# Patient Record
Sex: FEMALE | Race: WHITE | NOT HISPANIC OR LATINO | Employment: FULL TIME | URBAN - METROPOLITAN AREA
[De-identification: names, ages, dates, MRNs, and addresses within clinical notes are randomized per-mention and may not be internally consistent; named-entity substitution may affect disease eponyms.]

---

## 2021-10-27 ENCOUNTER — INITIAL PRENATAL (OUTPATIENT)
Dept: OBSTETRICS AND GYNECOLOGY | Facility: CLINIC | Age: 31
End: 2021-10-27

## 2021-10-27 VITALS
HEIGHT: 68 IN | BODY MASS INDEX: 33.34 KG/M2 | DIASTOLIC BLOOD PRESSURE: 70 MMHG | SYSTOLIC BLOOD PRESSURE: 118 MMHG | WEIGHT: 220 LBS

## 2021-10-27 DIAGNOSIS — Z3A.08 8 WEEKS GESTATION OF PREGNANCY: Primary | ICD-10-CM

## 2021-10-27 DIAGNOSIS — N83.8 OVARIAN MASS, RIGHT: ICD-10-CM

## 2021-10-27 PROCEDURE — 0501F PRENATAL FLOW SHEET: CPT | Performed by: OBSTETRICS & GYNECOLOGY

## 2021-10-27 NOTE — PROGRESS NOTES
Initial ob visit     CC- Here for care of pregnancy        Keyonna Truong is a 31 y.o. female, , who presents for her first obstetrical visit.  Patient's last menstrual period was 2021.    OB History    Para Term  AB Living   1             SAB IAB Ectopic Molar Multiple Live Births                    # Outcome Date GA Lbr Tyree/2nd Weight Sex Delivery Anes PTL Lv   1 Current                Initial positive test date : 2021, UPT          Prior obstetric issues, potential pregnancy concerns: ADHD, Anxiety   Family history of genetic issues (includes FOB): denies  Prior infections concerning in pregnancy (Rash, fever in last 2 weeks): denies  Varicella Hx -unsure  Prior testing for Cystic Fibrosis Carrier or Sickle Cell Trait- denies  Prepregnancy BMI - Body mass index is 33.95 kg/m².  History of STD: yes HPV  Ultrasound Today: Yes.  Findings showed viable pregnancy.  I have personally evaluated the U/S and agree with the findings. Denver Reaves MD    Additional Pertinent History   Last Pap :   Last Completed Pap Smear     This patient has no relevant Health Maintenance data.        History of abnormal Pap smear: yes - Colpo - not needed  Family history of uterine, colon, breast, or ovarian cancer: yes - MGM  Feelings of Anxiety or Depression: yes - stable off meds  Tobacco Usage?: No Quit with + UPT  Alcohol/Drug Use?: NO  Over the age of 35 at delivery: no  Desires Genetic Screening: Cell Free DNA  Flu Status: already had the flu vaccine     [facility]  Past Medical History:   Diagnosis Date   • Abnormal Pap smear of cervix    • Dysmenorrhea    • HPV (human papilloma virus) infection    • Pelvic pain        Current Outpatient Medications:   •  Prenat w/o B-MT-Louxqmm-FA-DHA (PNV-DHA PO), Take  by mouth., Disp: , Rfl:     The additional following portions of the patient's history were reviewed and updated as appropriate: allergies, current medications, past family history, past medical  "history, past social history, past surgical history and problem list.    Review of Systems   Review of Systems   Constitutional: Negative.    HENT: Negative.    Eyes: Negative.    Respiratory: Negative.    Cardiovascular: Negative.    Gastrointestinal: Negative.    Endocrine: Negative.    Genitourinary: Negative.    Musculoskeletal: Negative.    Skin: Negative.    Allergic/Immunologic: Negative.    Neurological: Negative.    Hematological: Negative.    Psychiatric/Behavioral: Negative.      Current obstetric complaints : Nausea   All systems reviewed and otherwise normal.    I have reviewed and agree with the HPI, ROS, and historical information as entered above. Denver Reaves MD    /70   Ht 171.5 cm (67.5\")   Wt 99.8 kg (220 lb)   LMP 2021   BMI 33.95 kg/m²     Physical Exam  General:  well developed; well nourished  no acute distress   Chest/Respiratory: No labored breathing, normal respiratory effort, normal appearance, no respiratory noises noted   Heart:  normal rate, regular rhythm,  no murmurs, rubs, or gallops   Thyroid: normal to inspection and palpation   Breasts:  Not performed.   Abdomen: soft, non-tender; no masses  no umbilical or inguinal hernias are present  no hepato-splenomegaly   Pelvis: Not performed.        Assessment and Plan    Problem List Items Addressed This Visit        Genitourinary and Reproductive     Ovarian mass, right    Overview     Solid-appearing 5 x 6 cm, follow-up at [facility] after 12 weeks            Gravid and     8 weeks gestation of pregnancy - Primary    Relevant Orders    Obstetric Panel    HIV-1 / O / 2 Ag / Antibody 4th Generation    Chlamydia trachomatis, Neisseria gonorrhoeae, PCR w/ confirmation - Urine, Urine, Clean Catch    Urine Drug Screen - Urine, Clean Catch    Urine Culture - Urine, Urine, Clean Catch    Urinalysis With Microscopic - Urine, Clean Catch          1. Pregnancy at 8w1d  2. Reviewed routine prenatal care with the office and " educational materials given  Return in about 4 weeks (around 11/24/2021), or 2 weeks for cell free DNA, 4 weeks for prenatal visit.      Denver Reaves MD  10/27/2021

## 2021-11-03 LAB
ABO GROUP BLD: NORMAL
AMPHETAMINES UR QL SCN: NEGATIVE NG/ML
APPEARANCE UR: CLEAR
BACTERIA #/AREA URNS HPF: ABNORMAL /[HPF]
BACTERIA UR CULT: NORMAL
BACTERIA UR CULT: NORMAL
BARBITURATES UR QL SCN: NEGATIVE NG/ML
BASOPHILS # BLD AUTO: 0.1 X10E3/UL (ref 0–0.2)
BASOPHILS NFR BLD AUTO: 1 %
BENZODIAZ UR QL SCN: NEGATIVE NG/ML
BILIRUB UR QL STRIP: NEGATIVE
BLD GP AB SCN SERPL QL: NEGATIVE
BZE UR QL SCN: NEGATIVE NG/ML
CANNABINOIDS UR QL SCN: NEGATIVE NG/ML
CASTS URNS QL MICRO: ABNORMAL /LPF
COLOR UR: YELLOW
CREAT UR-MCNC: 187 MG/DL (ref 20–300)
CRYSTALS URNS MICRO: ABNORMAL
EOSINOPHIL # BLD AUTO: 0.3 X10E3/UL (ref 0–0.4)
EOSINOPHIL NFR BLD AUTO: 3 %
EPI CELLS #/AREA URNS HPF: ABNORMAL /HPF (ref 0–10)
ERYTHROCYTE [DISTWIDTH] IN BLOOD BY AUTOMATED COUNT: 12.8 % (ref 11.7–15.4)
GLUCOSE UR QL: NEGATIVE
HBV SURFACE AG SERPL QL IA: NEGATIVE
HCT VFR BLD AUTO: 43.8 % (ref 34–46.6)
HCV AB S/CO SERPL IA: <0.1 S/CO RATIO (ref 0–0.9)
HGB BLD-MCNC: 14.4 G/DL (ref 11.1–15.9)
HGB UR QL STRIP: NEGATIVE
HIV 1+2 AB+HIV1 P24 AG SERPL QL IA: NON REACTIVE
IMM GRANULOCYTES # BLD AUTO: 0 X10E3/UL (ref 0–0.1)
IMM GRANULOCYTES NFR BLD AUTO: 0 %
KETONES UR QL STRIP: NEGATIVE
LABORATORY COMMENT REPORT: ABNORMAL
LEUKOCYTE ESTERASE UR QL STRIP: NEGATIVE
LYMPHOCYTES # BLD AUTO: 2.9 X10E3/UL (ref 0.7–3.1)
LYMPHOCYTES NFR BLD AUTO: 27 %
Lab: NORMAL
MCH RBC QN AUTO: 29.8 PG (ref 26.6–33)
MCHC RBC AUTO-ENTMCNC: 32.9 G/DL (ref 31.5–35.7)
MCV RBC AUTO: 91 FL (ref 79–97)
METHADONE UR QL SCN: NEGATIVE NG/ML
MICRO URNS: NORMAL
MICRO URNS: NORMAL
MONOCYTES # BLD AUTO: 0.7 X10E3/UL (ref 0.1–0.9)
MONOCYTES NFR BLD AUTO: 7 %
NEUTROPHILS # BLD AUTO: 6.7 X10E3/UL (ref 1.4–7)
NEUTROPHILS NFR BLD AUTO: 62 %
NITRITE UR QL STRIP: NEGATIVE
OPIATES UR QL SCN: POSITIVE NG/ML
OXYCODONE+OXYMORPHONE UR QL SCN: NEGATIVE NG/ML
PCP UR QL: NEGATIVE NG/ML
PH UR STRIP: 6 [PH] (ref 5–7.5)
PH UR: 5.6 [PH] (ref 4.5–8.9)
PLATELET # BLD AUTO: 339 X10E3/UL (ref 150–450)
PROPOXYPH UR QL SCN: NEGATIVE NG/ML
PROT UR QL STRIP: NEGATIVE
RBC # BLD AUTO: 4.84 X10E6/UL (ref 3.77–5.28)
RBC #/AREA URNS HPF: ABNORMAL /HPF (ref 0–2)
RH BLD: POSITIVE
RPR SER QL: NON REACTIVE
RUBV IGG SERPL IA-ACNC: 8.04 INDEX
SP GR UR: 1.03 (ref 1–1.03)
UNIDENT CRYS URNS QL MICRO: PRESENT
UROBILINOGEN UR STRIP-MCNC: 0.2 MG/DL (ref 0.2–1)
WBC # BLD AUTO: 10.6 X10E3/UL (ref 3.4–10.8)
WBC #/AREA URNS HPF: ABNORMAL /HPF (ref 0–5)

## 2021-11-08 ENCOUNTER — LAB (OUTPATIENT)
Dept: OBSTETRICS AND GYNECOLOGY | Facility: CLINIC | Age: 31
End: 2021-11-08

## 2021-11-08 DIAGNOSIS — Z3A.09 9 WEEKS GESTATION OF PREGNANCY: Primary | ICD-10-CM

## 2021-11-24 ENCOUNTER — ROUTINE PRENATAL (OUTPATIENT)
Dept: OBSTETRICS AND GYNECOLOGY | Facility: CLINIC | Age: 31
End: 2021-11-24

## 2021-11-24 VITALS — DIASTOLIC BLOOD PRESSURE: 82 MMHG | SYSTOLIC BLOOD PRESSURE: 122 MMHG | BODY MASS INDEX: 34.91 KG/M2 | WEIGHT: 226.2 LBS

## 2021-11-24 DIAGNOSIS — N83.299 COMPLEX OVARIAN CYST: ICD-10-CM

## 2021-11-24 DIAGNOSIS — Z3A.12 12 WEEKS GESTATION OF PREGNANCY: Primary | ICD-10-CM

## 2021-11-24 DIAGNOSIS — N83.8 OVARIAN MASS, RIGHT: ICD-10-CM

## 2021-11-24 PROBLEM — Z3A.08 8 WEEKS GESTATION OF PREGNANCY: Status: RESOLVED | Noted: 2021-10-27 | Resolved: 2021-11-24

## 2021-11-24 LAB
GLUCOSE UR STRIP-MCNC: NEGATIVE MG/DL
PROT UR STRIP-MCNC: NEGATIVE MG/DL

## 2021-11-24 PROCEDURE — 0502F SUBSEQUENT PRENATAL CARE: CPT | Performed by: OBSTETRICS & GYNECOLOGY

## 2021-11-24 NOTE — PROGRESS NOTES
OB FOLLOW UP    Keyonna Truong is a 31 y.o.  12w1d patient being seen today for her obstetrical follow up visit. Patient reports fatigue, headache and nausea.       ROS -   Contractions no   problems no  GI problems no  Bleeding or Leaking no  Fetal Movement : absent      Current Outpatient Medications:   •  Prenat w/o I-TR-Uxcqgdw-FA-DHA (PNV-DHA PO), Take  by mouth., Disp: , Rfl:     /82   Wt 103 kg (226 lb 3.2 oz)   LMP 2021   BMI 34.91 kg/m²     FHT:  150 BPM    Uterine Size: size equals dates   Presentations: unsure        Uterus-nontender   Assessment    1. Pregnancy at 12w1d  2. Fetal status reassuring     Problem List Items Addressed This Visit        Genitourinary and Reproductive     Ovarian mass, right    Overview     Solid-appearing 5 x 6 cm, follow-up at [facility] after 12 weeks         Complex ovarian cyst    Overview     Complex ovarian cyst with solid and cystic component, on the right, see [facility] at 14 weeks            Gravid and     12 weeks gestation of pregnancy - Primary    Relevant Orders    POC Urinalysis Dipstick (Completed)          Plan    1. P/patient return in 4 weeks  2. Prenatal teaching done and patient questions were answered  3. See [facility] to check ovarian cyst in 2 weeks    Denver Reaves MD  2021

## 2021-12-08 ENCOUNTER — HOSPITAL ENCOUNTER (OUTPATIENT)
Dept: WOMENS IMAGING | Facility: HOSPITAL | Age: 31
Discharge: HOME | End: 2021-12-08
Admitting: OBSTETRICS & GYNECOLOGY

## 2021-12-08 ENCOUNTER — OFFICE VISIT (OUTPATIENT)
Dept: OBSTETRICS AND GYNECOLOGY | Facility: HOSPITAL | Age: 31
End: 2021-12-08

## 2021-12-08 VITALS
BODY MASS INDEX: 34.1 KG/M2 | DIASTOLIC BLOOD PRESSURE: 74 MMHG | WEIGHT: 225 LBS | SYSTOLIC BLOOD PRESSURE: 118 MMHG | HEIGHT: 68 IN

## 2021-12-08 DIAGNOSIS — N83.8 OVARIAN MASS, RIGHT: Primary | ICD-10-CM

## 2021-12-08 PROCEDURE — 99212 OFFICE O/P EST SF 10 MIN: CPT | Performed by: OBSTETRICS & GYNECOLOGY

## 2021-12-08 PROCEDURE — 76817 TRANSVAGINAL US OBSTETRIC: CPT

## 2021-12-08 PROCEDURE — 76817 TRANSVAGINAL US OBSTETRIC: CPT | Performed by: OBSTETRICS & GYNECOLOGY

## 2021-12-08 NOTE — PROGRESS NOTES
"Reports frequent headaches since becoming pregnant. States she does notice some mild discomfort in RLQ with movement; \"like a twinge when I turn\". Denies other problems. Reports had NIPS with normal results. Next OB visit is 12/20/21.   "

## 2021-12-08 NOTE — PROGRESS NOTES
Patient seen in Maternal Fetal Medicine clinic today. Please see full note in under imaging tab of patient chart in Epic (Viewpoint report).    Wendy Ordonez MD

## 2021-12-20 ENCOUNTER — ROUTINE PRENATAL (OUTPATIENT)
Dept: OBSTETRICS AND GYNECOLOGY | Facility: CLINIC | Age: 31
End: 2021-12-20

## 2021-12-20 VITALS — BODY MASS INDEX: 34.62 KG/M2 | WEIGHT: 226 LBS | DIASTOLIC BLOOD PRESSURE: 70 MMHG | SYSTOLIC BLOOD PRESSURE: 112 MMHG

## 2021-12-20 DIAGNOSIS — N83.299 COMPLEX OVARIAN CYST: ICD-10-CM

## 2021-12-20 DIAGNOSIS — N83.8 OVARIAN MASS, RIGHT: ICD-10-CM

## 2021-12-20 DIAGNOSIS — Z3A.15 15 WEEKS GESTATION OF PREGNANCY: Primary | ICD-10-CM

## 2021-12-20 PROBLEM — Z3A.12 12 WEEKS GESTATION OF PREGNANCY: Status: RESOLVED | Noted: 2021-11-24 | Resolved: 2021-12-20

## 2021-12-20 LAB
EXPIRATION DATE: 0
GLUCOSE UR STRIP-MCNC: NEGATIVE MG/DL
Lab: 0
PROT UR STRIP-MCNC: NEGATIVE MG/DL

## 2021-12-20 PROCEDURE — 0502F SUBSEQUENT PRENATAL CARE: CPT | Performed by: OBSTETRICS & GYNECOLOGY

## 2021-12-20 NOTE — PROGRESS NOTES
OB FOLLOW UP    Keyonna Trunog is a 31 y.o.  15w6d patient being seen today for her obstetrical follow up visit. Patient reports headaches (no vision changes; Tylenol does not help) and nausea/vomiting (emesis x1 Friday). Patient reports [facility] referred her to an oncologist for the ovarian cyst, which was a little larger than she was comfortable with.    Her prenatal care is complicated by (and status): +UDS at NOB visit, ovarian cyst    ROS -   Contractions: no   problems: no  GI problems: yes  Bleeding or Leaking: no  Fetal Movement: absent      Current Outpatient Medications:   •  Prenat w/o N-LS-Jkxvemp-FA-DHA (PNV-DHA PO), Take  by mouth., Disp: , Rfl:   •  PRENATAL GUMMY VITAMIN, Chew 2 each Daily., Disp: , Rfl:     /70   Wt 103 kg (226 lb)   LMP 2021   BMI 34.62 kg/m²     FHT:  140 BPM    Uterine Size: size equals dates   Presentations: unsure        Uterus-nontender   Assessment    1. Pregnancy at 15w6d  2. Fetal status reassuring     Problem List Items Addressed This Visit        Genitourinary and Reproductive     Ovarian mass, right    Overview     Solid-appearing 5 x 6 cm, follow-up at [facility] after 12 weeks         Complex ovarian cyst    Overview     Complex ovarian cyst with solid and cystic component, on the right, see [facility] at 14 weeks            Gravid and     15 weeks gestation of pregnancy - Primary    Relevant Orders    Urine Drug Screen - Urine, Clean Catch    POC Glucose, Urine, Qualitative, Dipstick (Completed)    POC Protein, Urine, Qualitative, Dipstick (Completed)          Plan    1. P/patient return in 4 weeks to [facility] 8 weeks to see us again  2. Prenatal teaching done and patient questions were answered  3. Sees oncology to evaluate cyst next week    Denver Reaves MD  2021

## 2021-12-21 PROBLEM — N83.201 RIGHT OVARIAN CYST: Status: ACTIVE | Noted: 2021-12-21

## 2021-12-22 ENCOUNTER — OFFICE VISIT (OUTPATIENT)
Dept: GYNECOLOGIC ONCOLOGY | Facility: CLINIC | Age: 31
End: 2021-12-22

## 2021-12-22 VITALS
DIASTOLIC BLOOD PRESSURE: 79 MMHG | SYSTOLIC BLOOD PRESSURE: 124 MMHG | RESPIRATION RATE: 17 BRPM | WEIGHT: 226 LBS | BODY MASS INDEX: 34.25 KG/M2 | TEMPERATURE: 96.4 F | HEIGHT: 68 IN | HEART RATE: 98 BPM

## 2021-12-22 DIAGNOSIS — N83.8 OVARIAN MASS, RIGHT: Primary | ICD-10-CM

## 2021-12-22 DIAGNOSIS — Z3A.16 16 WEEKS GESTATION OF PREGNANCY: ICD-10-CM

## 2021-12-22 LAB
AMPHETAMINES UR QL SCN: NEGATIVE NG/ML
BARBITURATES UR QL SCN: NEGATIVE NG/ML
BENZODIAZ UR QL SCN: NEGATIVE NG/ML
BZE UR QL SCN: NEGATIVE NG/ML
CANNABINOIDS UR QL SCN: NEGATIVE NG/ML
CREAT UR-MCNC: 104.3 MG/DL (ref 20–300)
LABORATORY COMMENT REPORT: NORMAL
METHADONE UR QL SCN: NEGATIVE NG/ML
OPIATES UR QL SCN: NEGATIVE NG/ML
OXYCODONE+OXYMORPHONE UR QL SCN: NEGATIVE NG/ML
PCP UR QL: NEGATIVE NG/ML
PH UR: 6.4 [PH] (ref 4.5–8.9)
PROPOXYPH UR QL SCN: NEGATIVE NG/ML

## 2021-12-22 PROCEDURE — 99203 OFFICE O/P NEW LOW 30 MIN: CPT | Performed by: OBSTETRICS & GYNECOLOGY

## 2021-12-22 NOTE — PROGRESS NOTES
Keyonna Truong  [id number]  1990      Reason for visit:  Ovarian mass, 16 weeks pregnant    Consultation:  Patient is being seen at the request of Dr. Ordonez      History of present illness:  The patient is a 31 y.o. year old female who presents today for treatment and evaluation of the above issues.    On OB US  Dr. Ordonez noted a 7x5x6 cm homogenous mass in the posterior cul de sac. Patient has a history of pelvic pain with suspected but not confirmed diagnosis of endometriosis. Patient feels brief sharp pain on her L side  while turning over in bed at night. She states the pain is very minimal. She does not feel any pain through out her daily activities. She has not any abnormal bleeding. Pelvic pain started several months ago.      For new patients, [facility] intake form from today was reviewed and confirmed.    OBGYN History:  She is a .  She does not use HRT. She does have a history of abnormal pap smears (irregular in , normal since).      Oncologic History:  Oncology/Hematology History    No history exists.         Past Medical History:   Diagnosis Date   • Abnormal Pap smear of cervix      Colpo not needed but was done   • ADHD (attention deficit hyperactivity disorder)     took Adderall from age 14   • Anxiety    • Dysmenorrhea     hx see used to see Dr. Felicita Giang and Ivette HOYOS   • HPV (human papilloma virus) infection        • Pelvic pain     hx       Past Surgical History:   Procedure Laterality Date   • TONSILLECTOMY     • WISDOM TOOTH EXTRACTION         MEDICATIONS: The current medication list was reviewed with the patient and updated in the EMR this date per the Medical Assistant. Medication dosages and frequencies were confirmed to be accurate.      Allergies:  has No Known Allergies.    Social History:   Social History     Socioeconomic History   • Marital status:    Tobacco Use   • Smoking status: Former Smoker     Packs/day: 0.50     Types:  "Cigarettes     Quit date: 2021     Years since quittin.3   • Smokeless tobacco: Never Used   Vaping Use   • Vaping Use: Never used   Substance and Sexual Activity   • Alcohol use: Not Currently     Comment: 10/week when not pregnant   • Drug use: Never   • Sexual activity: Defer     Partners: Male     Birth control/protection: None       Family History:    Family History   Problem Relation Age of Onset   • Breast cancer Maternal Grandmother    • Stomach cancer Paternal Grandfather    • Alcohol abuse Neg Hx    • Arthritis Neg Hx    • Asthma Neg Hx    • Birth defects Neg Hx    • Bleeding Disorder Neg Hx        Health Maintenance:    Health Maintenance   Topic Date Due   • ANNUAL PHYSICAL  Never done   • COVID-19 Vaccine (3 - Booster) 2021   • PAP SMEAR  Never done   • TDAP/TD VACCINES (3 - Td or Tdap) 2026   • HEPATITIS C SCREENING  Completed   • INFLUENZA VACCINE  Completed   • Pneumococcal Vaccine 0-64  Aged Out     Review of Systems   Mild pelvic pain that is positional,denies fatigue, vaginal bleeding, abdominal pain/ bloating    Physical Exam    Vitals:    21 1359   BP: 124/79   Pulse: 98   Resp: 17   Temp: 96.4 °F (35.8 °C)   TempSrc: Temporal   Weight: 103 kg (226 lb)   Height: 172.1 cm (67.76\")   PainSc: 0-No pain       Body mass index is 34.61 kg/m².    Wt Readings from Last 3 Encounters:   21 103 kg (226 lb)   21 103 kg (226 lb)   21 102 kg (225 lb)         GENERAL: Alert, well-appearing female appearing her stated age who is in no apparent distress.   HEENT: Sclera anicteric. Head normocephalic, atraumatic. Mucus membranes moist.   NECK: Trachea midline, supple, without masses.  No thyromegaly.   BREASTS: Deferred  CARDIOVASCULAR: Normal rate.  RESPIRATORY:  normal respiratory effort  BACK:  No CVA tenderness, no vertebral tenderness on palpation  GASTROINTESTINAL:  Abdomen is gravid  SKIN:  Warm, dry, well-perfused.  All visible areas intact.  No rashes, lesions, " ulcers.  PSYCHIATRIC: AO x3, with appropriate affect, normal thought processes.  NEUROLOGIC: No focal deficits.     EXTREMITIES:   No cyanosis, clubbing, symmetric.  LYMPHATICS:  No cervical or inguinal adenopathy noted.     PELVIC exam:    Deferred     ECOG PS 0    PROCEDURES:  none    Diagnostic Data:      US 21  Right ovary with a 6 x 7 x 5cm mass with homogeneous internal echoes. The cyst wall appears smooth. There are no notable septations or calcifications  Images personally reviewed by Dr. Charles.  Ultrasound is remarkable for uniform mass of right adnexa, stable in size from previous ultrasound.  No septations, papillary projections, free fluid, or excrescences appreciated    Lab Results   Component Value Date    WBC 10.6 10/28/2021    HGB 14.4 10/28/2021    HCT 43.8 10/28/2021    MCV 91 10/28/2021     10/28/2021    NEUTROABS 6.7 10/28/2021     No results found for:         Assessment/Plan   This is a 31 y.o. woman presenting for evaluation of ovarian mass, 15 weeks pregnant  Encounter Diagnoses   Name Primary?   • Ovarian mass, right Yes   • 16 weeks gestation of pregnancy      It is not likely that this mass is malignant, however, there is no way to diagnose if the mass is cancerous vs benign without tissue pathoplogy. This mass is homogenous, less than 10 cm and has not changed in size over the course of the pregnancy. Discussed probability that the mass is likely an endometrioma or hemorrhagic cyst.There are risks to performing surgery in pregnancy including  birth. It would be reasonable to defer surgical management until delivery. Monitoring for new or changing symptoms along with continued US surveillance recommended. Patient given precautions for ovarian torsion. Follow up with regular ObGyn.     Pain assessment was performed today as a part of patient’s care.  For patients with pain related to surgery, gynecologic malignancy or cancer treatment, the plan is as noted in the  assessment/plan.  For patients with pain not related to these issues, they are to seek any further needed care from a more appropriate provider, such as PCP.      No orders of the defined types were placed in this encounter.      FOLLOW UP: As needed    Patient was seen and examined with Dr. Avila,  resident, who performed portions of the examination and documentation for this patient's care under my direct supervision.  I agree with the above documentation and plan.    Amy Charles MD  12/27/21  11:45 EST

## 2021-12-23 ENCOUNTER — PATIENT ROUNDING (BHMG ONLY) (OUTPATIENT)
Dept: GYNECOLOGIC ONCOLOGY | Facility: CLINIC | Age: 31
End: 2021-12-23

## 2021-12-23 NOTE — PROGRESS NOTES
A My-Chart message has been sent to the patient for PATIENT ROUNDING with [facility].

## 2021-12-27 PROBLEM — Z3A.16 16 WEEKS GESTATION OF PREGNANCY: Status: ACTIVE | Noted: 2021-12-20

## 2022-01-19 ENCOUNTER — OFFICE VISIT (OUTPATIENT)
Dept: OBSTETRICS AND GYNECOLOGY | Facility: HOSPITAL | Age: 32
End: 2022-01-19

## 2022-01-19 ENCOUNTER — HOSPITAL ENCOUNTER (OUTPATIENT)
Dept: WOMENS IMAGING | Facility: HOSPITAL | Age: 32
Discharge: HOME | End: 2022-01-19
Admitting: OBSTETRICS & GYNECOLOGY

## 2022-01-19 VITALS
WEIGHT: 229.8 LBS | DIASTOLIC BLOOD PRESSURE: 76 MMHG | SYSTOLIC BLOOD PRESSURE: 133 MMHG | HEART RATE: 82 BPM | BODY MASS INDEX: 35.19 KG/M2

## 2022-01-19 DIAGNOSIS — N83.8 OVARIAN MASS, RIGHT: ICD-10-CM

## 2022-01-19 DIAGNOSIS — N83.299 COMPLEX OVARIAN CYST: Primary | ICD-10-CM

## 2022-01-19 DIAGNOSIS — Z3A.20 20 WEEKS GESTATION OF PREGNANCY: ICD-10-CM

## 2022-01-19 PROCEDURE — 76817 TRANSVAGINAL US OBSTETRIC: CPT

## 2022-01-19 PROCEDURE — 76811 OB US DETAILED SNGL FETUS: CPT

## 2022-01-19 PROCEDURE — 76811 OB US DETAILED SNGL FETUS: CPT | Performed by: OBSTETRICS & GYNECOLOGY

## 2022-01-19 PROCEDURE — 76817 TRANSVAGINAL US OBSTETRIC: CPT | Performed by: OBSTETRICS & GYNECOLOGY

## 2022-01-19 NOTE — PROGRESS NOTES
Patient seen in Maternal Fetal Medicine clinic today. Please see full note in under imaging tab of patient chart in Epic (Viewpoint report).    Ling Skinner MD

## 2022-01-24 ENCOUNTER — ROUTINE PRENATAL (OUTPATIENT)
Dept: OBSTETRICS AND GYNECOLOGY | Facility: CLINIC | Age: 32
End: 2022-01-24

## 2022-01-24 VITALS — SYSTOLIC BLOOD PRESSURE: 128 MMHG | WEIGHT: 229.8 LBS | DIASTOLIC BLOOD PRESSURE: 78 MMHG | BODY MASS INDEX: 35.19 KG/M2

## 2022-01-24 DIAGNOSIS — Z3A.20 20 WEEKS GESTATION OF PREGNANCY: Primary | ICD-10-CM

## 2022-01-24 DIAGNOSIS — N83.8 OVARIAN MASS, RIGHT: ICD-10-CM

## 2022-01-24 PROBLEM — Z3A.16 16 WEEKS GESTATION OF PREGNANCY: Status: RESOLVED | Noted: 2021-12-20 | Resolved: 2022-01-24

## 2022-01-24 LAB
GLUCOSE UR STRIP-MCNC: NEGATIVE MG/DL
PROT UR STRIP-MCNC: NEGATIVE MG/DL

## 2022-01-24 PROCEDURE — 0502F SUBSEQUENT PRENATAL CARE: CPT | Performed by: OBSTETRICS & GYNECOLOGY

## 2022-01-24 NOTE — PROGRESS NOTES
OB FOLLOW UP    Keyonna Truong is a 31 y.o.  20w6d patient being seen today for her obstetrical follow up visit. Patient reports no complaints. Patient had anatomy scan done last week with [facility].     Her prenatal care is complicated by (and status) :  +UDS at NOB visit, ovarian mass    ROS -   Contractions: Denies   problems: Denies  GI problems: Denies  Bleeding or Leaking: Denies  Fetal Movement : Present       Current Outpatient Medications:   •  Prenat w/o D-NO-Kmdonoi-FA-DHA (PNV-DHA PO), Take  by mouth., Disp: , Rfl:     /78   Wt 104 kg (229 lb 12.8 oz)   LMP 2021   BMI 35.19 kg/m²     FHT:  140BPM    Uterine Size: size equals dates   Presentations: unsure        Uterus-nontender   Assessment    1. Pregnancy at 20w6d  2. Fetal status reassuring     Problem List Items Addressed This Visit        Genitourinary and Reproductive     Ovarian mass, right    Overview     Solid-appearing 5 x 6 cm, follow-up at [facility] after 12 weeks            Gravid and     20 weeks gestation of pregnancy - Primary    Relevant Orders    POC Urinalysis Dipstick (Completed)          Plan    1. P/patient return in 5 weeks  2. Prenatal teaching done and patient questions were answered  3. Follow-up with [facility] in 5 weeks and then see us    Denver Reaves MD  2022

## 2022-02-14 ENCOUNTER — TELEPHONE (OUTPATIENT)
Dept: OBSTETRICS AND GYNECOLOGY | Facility: CLINIC | Age: 32
End: 2022-02-14

## 2022-02-21 ENCOUNTER — ROUTINE PRENATAL (OUTPATIENT)
Dept: OBSTETRICS AND GYNECOLOGY | Facility: CLINIC | Age: 32
End: 2022-02-21

## 2022-02-21 VITALS — WEIGHT: 236.8 LBS | DIASTOLIC BLOOD PRESSURE: 74 MMHG | SYSTOLIC BLOOD PRESSURE: 126 MMHG | BODY MASS INDEX: 36.27 KG/M2

## 2022-02-21 DIAGNOSIS — Z3A.24 24 WEEKS GESTATION OF PREGNANCY: Primary | ICD-10-CM

## 2022-02-21 DIAGNOSIS — N83.299 COMPLEX OVARIAN CYST: ICD-10-CM

## 2022-02-21 LAB
CLARITY, POC: CLEAR
COLOR UR: YELLOW
GLUCOSE UR STRIP-MCNC: NEGATIVE MG/DL
PROT UR STRIP-MCNC: NEGATIVE MG/DL

## 2022-02-21 PROCEDURE — 0502F SUBSEQUENT PRENATAL CARE: CPT | Performed by: NURSE PRACTITIONER

## 2022-02-21 NOTE — PROGRESS NOTES
OB FOLLOW UP  CC- Here for care of pregnancy        Keyonna Truong is a 31 y.o.  24w6d patient being seen today for her obstetrical follow up visit. Patient reports occasional contractions, headaches, cramping and tightness in belly.     Her prenatal care is complicated by (and status) :    Patient Active Problem List   Diagnosis   • Ovarian mass, right   • Complex ovarian cyst   • Right ovarian cyst   • 20 weeks gestation of pregnancy       Flu Status: Already given in current flu season  Ultrasound Today: No.    ROS -   Patient Reports : Headaches, Contractions, Cramping, and Tightness in Belly  Patient Denies: Loss of Fluid, Vaginal Spotting, Vision Changes, Nausea  and Vomiting   Fetal Movement : normal  All other systems reviewed and are negative.       The additional following portions of the patient's history were reviewed and updated as appropriate: allergies and current medications.    I have reviewed and agree with the HPI, ROS, and historical information as entered above. Chika Medina, APRN    /74   Wt 107 kg (236 lb 12.8 oz)   LMP 2021   BMI 36.27 kg/m²       EXAM:     FHT: pos BPM   Uterine Size: 24 cm  Pelvic Exam: No    Urine glucose/protein: See prenatal flowsheet       Assessment and Plan    Problem List Items Addressed This Visit        Genitourinary and Reproductive     Complex ovarian cyst    Overview     Complex ovarian cyst with solid and cystic component, on the right, see [facility] at 14 weeks           Other Visit Diagnoses     24 weeks gestation of pregnancy    -  Primary    Relevant Orders    POC Urinalysis Dipstick (Completed)          1. Pregnancy at 24w6d  2. Fetal status reassuring.   3. Activity and Exercise discussed.  Return for 3-4 weeks for 1 hr gtt . Has [facility] scan next week.     ROMAIN Ornelas  2022

## 2022-03-02 ENCOUNTER — HOSPITAL ENCOUNTER (OUTPATIENT)
Dept: WOMENS IMAGING | Facility: HOSPITAL | Age: 32
Discharge: HOME | End: 2022-03-02
Admitting: OBSTETRICS & GYNECOLOGY

## 2022-03-02 ENCOUNTER — OFFICE VISIT (OUTPATIENT)
Dept: OBSTETRICS AND GYNECOLOGY | Facility: HOSPITAL | Age: 32
End: 2022-03-02

## 2022-03-02 VITALS — SYSTOLIC BLOOD PRESSURE: 119 MMHG | DIASTOLIC BLOOD PRESSURE: 65 MMHG | BODY MASS INDEX: 36.42 KG/M2 | WEIGHT: 237.8 LBS

## 2022-03-02 DIAGNOSIS — N83.8 OVARIAN MASS, RIGHT: Primary | ICD-10-CM

## 2022-03-02 DIAGNOSIS — Z3A.20 20 WEEKS GESTATION OF PREGNANCY: ICD-10-CM

## 2022-03-02 DIAGNOSIS — N83.299 COMPLEX OVARIAN CYST: ICD-10-CM

## 2022-03-02 PROCEDURE — 76817 TRANSVAGINAL US OBSTETRIC: CPT | Performed by: OBSTETRICS & GYNECOLOGY

## 2022-03-02 PROCEDURE — 76816 OB US FOLLOW-UP PER FETUS: CPT

## 2022-03-02 PROCEDURE — 76817 TRANSVAGINAL US OBSTETRIC: CPT

## 2022-03-02 PROCEDURE — 76816 OB US FOLLOW-UP PER FETUS: CPT | Performed by: OBSTETRICS & GYNECOLOGY

## 2022-03-21 ENCOUNTER — ROUTINE PRENATAL (OUTPATIENT)
Dept: OBSTETRICS AND GYNECOLOGY | Facility: CLINIC | Age: 32
End: 2022-03-21

## 2022-03-21 VITALS — DIASTOLIC BLOOD PRESSURE: 70 MMHG | SYSTOLIC BLOOD PRESSURE: 118 MMHG | WEIGHT: 241.2 LBS | BODY MASS INDEX: 36.94 KG/M2

## 2022-03-21 DIAGNOSIS — Z3A.28 28 WEEKS GESTATION OF PREGNANCY: Primary | ICD-10-CM

## 2022-03-21 DIAGNOSIS — N83.299 COMPLEX OVARIAN CYST: ICD-10-CM

## 2022-03-21 DIAGNOSIS — Z23 NEED FOR TDAP VACCINATION: ICD-10-CM

## 2022-03-21 DIAGNOSIS — N83.201 RIGHT OVARIAN CYST: ICD-10-CM

## 2022-03-21 LAB
EXPIRATION DATE: 0
GLUCOSE UR STRIP-MCNC: NEGATIVE MG/DL
Lab: 0
PROT UR STRIP-MCNC: NEGATIVE MG/DL

## 2022-03-21 PROCEDURE — 0502F SUBSEQUENT PRENATAL CARE: CPT | Performed by: OBSTETRICS & GYNECOLOGY

## 2022-03-21 PROCEDURE — 90471 IMMUNIZATION ADMIN: CPT | Performed by: OBSTETRICS & GYNECOLOGY

## 2022-03-21 PROCEDURE — 90715 TDAP VACCINE 7 YRS/> IM: CPT | Performed by: OBSTETRICS & GYNECOLOGY

## 2022-03-21 NOTE — PROGRESS NOTES
OB FOLLOW UP    Keyonna Truong is a 31 y.o.  28w6d patient being seen today for her obstetrical follow up visit. Patient reports bilateral wrist ache in the AMs and mild heartburn (pepcid complete or gaveston helps). 28 teaching and TDaP offered today.    Her prenatal care is complicated by (and status): endometrioma, anxiety/ADHD    ROS -   Contractions: no   problems: no  GI problems: yes, see above  Bleeding or Leaking: no  Fetal Movement: present      Current Outpatient Medications:   •  Prenat w/o T-VT-Hxsyufc-FA-DHA (PNV-DHA PO), Take  by mouth., Disp: , Rfl:     /70   Wt 109 kg (241 lb 3.2 oz)   LMP 2021   BMI 36.94 kg/m²     FHT:  140BPM    Uterine Size: size equals dates   Presentations: unsure        Uterus-nontender   Assessment    1. Pregnancy at 28w6d  2. Fetal status reassuring     Problem List Items Addressed This Visit        Genitourinary and Reproductive     Complex ovarian cyst    Overview     Complex ovarian cyst with solid and cystic component, on the right, see [facility] at 14 weeks           Right ovarian cyst       Gravid and     28 weeks gestation of pregnancy - Primary    Relevant Orders    CBC (No Diff)    Antibody Screen    Gestational Screen 1 Hr ([facility])    POC Glucose, Urine, Qualitative, Dipstick (Completed)    POC Protein, Urine, Qualitative, Dipstick (Completed)    Tdap Vaccine Greater Than or Equal To 6yo IM (Completed)      Other Visit Diagnoses     Need for Tdap vaccination        Relevant Orders    Tdap Vaccine Greater Than or Equal To 6yo IM (Completed)          Plan    1. P/patient return in 3 weeks  2. Prenatal teaching done and patient questions were answered  3. See [facility] in April    Denver Reaves MD  2022

## 2022-03-22 LAB
BLD GP AB SCN SERPL QL: NEGATIVE
C TRACH RRNA SPEC QL NAA+PROBE: NEGATIVE
ERYTHROCYTE [DISTWIDTH] IN BLOOD BY AUTOMATED COUNT: 12.9 % (ref 12.3–15.4)
GLUCOSE 1H P 50 G GLC PO SERPL-MCNC: 127 MG/DL (ref 65–139)
HCT VFR BLD AUTO: 36.2 % (ref 34–46.6)
HGB BLD-MCNC: 12 G/DL (ref 12–15.9)
MCH RBC QN AUTO: 29.6 PG (ref 26.6–33)
MCHC RBC AUTO-ENTMCNC: 33.1 G/DL (ref 31.5–35.7)
MCV RBC AUTO: 89.4 FL (ref 79–97)
N GONORRHOEA RRNA SPEC QL NAA+PROBE: NEGATIVE
PLATELET # BLD AUTO: 255 10*3/MM3 (ref 140–450)
RBC # BLD AUTO: 4.05 10*6/MM3 (ref 3.77–5.28)
WBC # BLD AUTO: 10.65 10*3/MM3 (ref 3.4–10.8)

## 2022-04-13 ENCOUNTER — HOSPITAL ENCOUNTER (OUTPATIENT)
Dept: WOMENS IMAGING | Facility: HOSPITAL | Age: 32
Discharge: HOME | End: 2022-04-13
Admitting: OBSTETRICS & GYNECOLOGY

## 2022-04-13 ENCOUNTER — OFFICE VISIT (OUTPATIENT)
Dept: OBSTETRICS AND GYNECOLOGY | Facility: HOSPITAL | Age: 32
End: 2022-04-13

## 2022-04-13 VITALS — DIASTOLIC BLOOD PRESSURE: 76 MMHG | SYSTOLIC BLOOD PRESSURE: 135 MMHG | BODY MASS INDEX: 37.98 KG/M2 | WEIGHT: 248 LBS

## 2022-04-13 DIAGNOSIS — N83.8 OVARIAN MASS, RIGHT: ICD-10-CM

## 2022-04-13 DIAGNOSIS — N83.201 RIGHT OVARIAN CYST: Primary | ICD-10-CM

## 2022-04-13 DIAGNOSIS — Z34.90 PREGNANCY, UNSPECIFIED GESTATIONAL AGE: ICD-10-CM

## 2022-04-13 PROCEDURE — 76816 OB US FOLLOW-UP PER FETUS: CPT | Performed by: OBSTETRICS & GYNECOLOGY

## 2022-04-13 PROCEDURE — 76816 OB US FOLLOW-UP PER FETUS: CPT

## 2022-04-14 ENCOUNTER — TELEPHONE (OUTPATIENT)
Dept: OBSTETRICS AND GYNECOLOGY | Facility: CLINIC | Age: 32
End: 2022-04-14

## 2022-04-14 NOTE — TELEPHONE ENCOUNTER
Spoke with pt and let her know that she can start vit D, vit C, zinc, and baby ASA until delivery and that if she has sinus congestion she can take regular mucinex.    She JOSE F

## 2022-04-28 ENCOUNTER — ROUTINE PRENATAL (OUTPATIENT)
Dept: OBSTETRICS AND GYNECOLOGY | Facility: CLINIC | Age: 32
End: 2022-04-28

## 2022-04-28 VITALS — BODY MASS INDEX: 38.01 KG/M2 | DIASTOLIC BLOOD PRESSURE: 80 MMHG | SYSTOLIC BLOOD PRESSURE: 120 MMHG | WEIGHT: 248.2 LBS

## 2022-04-28 DIAGNOSIS — Z3A.34 34 WEEKS GESTATION OF PREGNANCY: Primary | ICD-10-CM

## 2022-04-28 LAB
EXPIRATION DATE: 0
GLUCOSE UR STRIP-MCNC: NEGATIVE MG/DL
Lab: 0
PROT UR STRIP-MCNC: NEGATIVE MG/DL

## 2022-04-28 PROCEDURE — 0502F SUBSEQUENT PRENATAL CARE: CPT | Performed by: OBSTETRICS & GYNECOLOGY

## 2022-04-28 NOTE — PROGRESS NOTES
OB FOLLOW UP    Keyonna Truong is a 31 y.o.  34w2d patient being seen today for her obstetrical follow up visit. Patient reports carpal tunnel symptoms, headache, heartburn, nausea and sore in pelvic area.  patient states she white/ greenish discharge come out a few days ago possibly mucus plug.     Her prenatal care is complicated by (and status) :    Patient Active Problem List   Diagnosis   • Ovarian mass, right   • Complex ovarian cyst   • Right ovarian cyst   • 20 weeks gestation of pregnancy   • 28 weeks gestation of pregnancy       ROS -   Patient Reports : Headaches  Patient Denies:  Loss of Fluid, Vaginal Spotting, Vision Changes and Cramping/Contractions   Fetal Movement : normal    I have reviewed and agree with the HPI, ROS, and historical information as entered above. Radha Capps MD    /80   Wt 113 kg (248 lb 3.2 oz)   LMP 2021   BMI 38.01 kg/m²     Ultrasound Today: no    EXAM:  Vitals: See prenatal flowsheet   Abdomen: See prenatal flowsheet   Urine glucose/protein: See prenatal flowsheet   HRT: See prenatal flowsheet   Presentation: See prenatal flowsheet       Pelvic: Wet mount neg.     Assessment    1. Pregnancy at 34w2d  2. Known endometrioma  3. Fetal status reassuring     Problem List Items Addressed This Visit    None     Visit Diagnoses     34 weeks gestation of pregnancy    -  Primary    Relevant Orders    POC Glucose, Urine, Qualitative, Dipstick (Completed)    POC Protein, Urine, Qualitative, Dipstick (Completed)          Plan    1. Reviewed chart.  2. [facility] scan in 2 weeks  3. Follow up: 2 week(s)       Radha Capps MD  2022

## 2022-05-03 ENCOUNTER — PATIENT MESSAGE (OUTPATIENT)
Dept: OBSTETRICS AND GYNECOLOGY | Facility: CLINIC | Age: 32
End: 2022-05-03

## 2022-05-03 ENCOUNTER — TELEPHONE (OUTPATIENT)
Dept: OBSTETRICS AND GYNECOLOGY | Facility: CLINIC | Age: 32
End: 2022-05-03

## 2022-05-03 DIAGNOSIS — N80.129 ENDOMETRIOMA: ICD-10-CM

## 2022-05-03 DIAGNOSIS — N80.129 ENDOMETRIOMA: Primary | ICD-10-CM

## 2022-05-03 DIAGNOSIS — Z3A.35 35 WEEKS GESTATION OF PREGNANCY: Primary | ICD-10-CM

## 2022-05-03 NOTE — TELEPHONE ENCOUNTER
Patient needs f/u u/s at [facility] for endometrioma. States [facility] does not have an order yet.     Will check on status for patient. She vu.     Last OV 4/28/22.

## 2022-05-03 NOTE — TELEPHONE ENCOUNTER
Pt stated she reached out to [facility] to get her apt scheduled since they haven't called her and was told they do not have an order for her. Pt was told a new order needed to be put in to get her scheduled.

## 2022-05-12 ENCOUNTER — HOSPITAL ENCOUNTER (OUTPATIENT)
Dept: WOMENS IMAGING | Facility: HOSPITAL | Age: 32
Discharge: HOME | End: 2022-05-12
Admitting: OBSTETRICS & GYNECOLOGY

## 2022-05-12 ENCOUNTER — ROUTINE PRENATAL (OUTPATIENT)
Dept: OBSTETRICS AND GYNECOLOGY | Facility: CLINIC | Age: 32
End: 2022-05-12

## 2022-05-12 ENCOUNTER — OFFICE VISIT (OUTPATIENT)
Dept: OBSTETRICS AND GYNECOLOGY | Facility: HOSPITAL | Age: 32
End: 2022-05-12

## 2022-05-12 VITALS — DIASTOLIC BLOOD PRESSURE: 73 MMHG | WEIGHT: 254.2 LBS | BODY MASS INDEX: 38.93 KG/M2 | SYSTOLIC BLOOD PRESSURE: 139 MMHG

## 2022-05-12 VITALS — SYSTOLIC BLOOD PRESSURE: 114 MMHG | DIASTOLIC BLOOD PRESSURE: 78 MMHG | WEIGHT: 253 LBS | BODY MASS INDEX: 38.75 KG/M2

## 2022-05-12 DIAGNOSIS — Z3A.35 35 WEEKS GESTATION OF PREGNANCY: ICD-10-CM

## 2022-05-12 DIAGNOSIS — N80.129 ENDOMETRIOMA: ICD-10-CM

## 2022-05-12 DIAGNOSIS — N83.299 COMPLEX OVARIAN CYST: Primary | ICD-10-CM

## 2022-05-12 DIAGNOSIS — Z3A.36 36 WEEKS GESTATION OF PREGNANCY: Primary | ICD-10-CM

## 2022-05-12 LAB
GLUCOSE UR STRIP-MCNC: NEGATIVE MG/DL
PROT UR STRIP-MCNC: NEGATIVE MG/DL

## 2022-05-12 PROCEDURE — 76819 FETAL BIOPHYS PROFIL W/O NST: CPT

## 2022-05-12 PROCEDURE — 76819 FETAL BIOPHYS PROFIL W/O NST: CPT | Performed by: OBSTETRICS & GYNECOLOGY

## 2022-05-12 PROCEDURE — 76816 OB US FOLLOW-UP PER FETUS: CPT | Performed by: OBSTETRICS & GYNECOLOGY

## 2022-05-12 PROCEDURE — 76816 OB US FOLLOW-UP PER FETUS: CPT

## 2022-05-12 PROCEDURE — 0502F SUBSEQUENT PRENATAL CARE: CPT | Performed by: OBSTETRICS & GYNECOLOGY

## 2022-05-12 NOTE — PROGRESS NOTES
OB FOLLOW UP    Keyonna Truong is a 31 y.o.  36w2d patient being seen today for her obstetrical follow up visit. Patient reports no complaints. Pt had [facility] scan today.    Her prenatal care is complicated by (and status) : Covid during pregnancy    Her kick counts are adequate    The additional following portions of the patient's history were reviewed and updated as appropriate: allergies, current medications, past family history, past medical history, past social history, past surgical history and problem list.      ROS -   none     Vaginal bleeding none    /78   Wt 115 kg (253 lb)   LMP 2021   BMI 38.75 kg/m²     FHT:  present   Pelvic Exam: Performed: closed     Assessment    1. Pregnancy at 36w2d  2. Fetal status reassuring  3. Last U/S [facility] today  4. TDAP was already given  5. GBS today if indicated.    Problem List Items Addressed This Visit    None     Visit Diagnoses     36 weeks gestation of pregnancy    -  Primary    Relevant Orders    Group B Streptococcus Culture - Swab, Vaginal/Rectum    POC Urinalysis Dipstick (Completed)          Plan    1. Reviewed kick counts.  [facility] scan reviewed  2. GBS was done.  3. Reviewed routine prenatal care with the office and educational materials given   4. Discussed options between 39-41 weeks.  5. Follow up: 1 weeks  6. Call for any problems    Radha Capps MD  2022

## 2022-05-13 ENCOUNTER — LAB (OUTPATIENT)
Dept: LAB | Facility: HOSPITAL | Age: 32
End: 2022-05-13

## 2022-05-13 PROCEDURE — 87081 CULTURE SCREEN ONLY: CPT | Performed by: OBSTETRICS & GYNECOLOGY

## 2022-05-16 LAB — BACTERIA SPEC AEROBE CULT: NORMAL

## 2022-05-19 ENCOUNTER — ROUTINE PRENATAL (OUTPATIENT)
Dept: OBSTETRICS AND GYNECOLOGY | Facility: CLINIC | Age: 32
End: 2022-05-19

## 2022-05-19 VITALS — BODY MASS INDEX: 39.36 KG/M2 | WEIGHT: 257 LBS | SYSTOLIC BLOOD PRESSURE: 122 MMHG | DIASTOLIC BLOOD PRESSURE: 86 MMHG

## 2022-05-19 DIAGNOSIS — Z3A.37 37 WEEKS GESTATION OF PREGNANCY: Primary | ICD-10-CM

## 2022-05-19 DIAGNOSIS — O41.03X0 OLIGOHYDRAMNIOS WITHOUT RUPTURE OF MEMBRANES, THIRD TRIMESTER, NOT APPLICABLE OR UNSPECIFIED FETUS: ICD-10-CM

## 2022-05-19 LAB
EXPIRATION DATE: 0
GLUCOSE UR STRIP-MCNC: NEGATIVE MG/DL
Lab: 0
PROT UR STRIP-MCNC: NEGATIVE MG/DL

## 2022-05-19 PROCEDURE — 0502F SUBSEQUENT PRENATAL CARE: CPT | Performed by: OBSTETRICS & GYNECOLOGY

## 2022-05-19 NOTE — PROGRESS NOTES
OB FOLLOW UP    Keyonna Truong is a 31 y.o.  37w2d patient being seen today for her obstetrical follow up visit. Patient reports soreness, intermittent tightening of belly, and trace swelling in BLE and hands.     Her prenatal care is complicated by (and status) : None    Her kick counts are adequate    The additional following portions of the patient's history were reviewed and updated as appropriate: allergies and current medications.    Patient requesting to be checked for dilation.      ROS -   see above     Vaginal bleeding: none    /86   Wt 117 kg (257 lb)   LMP 2021   BMI 39.36 kg/m²     FHT:  present   Pelvic Exam: Performed: No     Assessment    1. Pregnancy at 37w2d  2. Fetal status reassuring  3. Last U/S 22- BPP   4. TDAP was already given    Problem List Items Addressed This Visit    None     Visit Diagnoses     37 weeks gestation of pregnancy    -  Primary    Relevant Orders    POC Glucose, Urine, Qualitative, Dipstick (Completed)    POC Protein, Urine, Qualitative, Dipstick (Completed)          Plan    1. Reviewed kick counts.  2. Reviewed routine prenatal care with the office and educational materials given   3. SOFI normal.  4. Follow up: 1 week  5. Call for any problems    Radha Capps MD  2022

## 2022-05-26 ENCOUNTER — ROUTINE PRENATAL (OUTPATIENT)
Dept: OBSTETRICS AND GYNECOLOGY | Facility: CLINIC | Age: 32
End: 2022-05-26

## 2022-05-26 VITALS — BODY MASS INDEX: 39.05 KG/M2 | SYSTOLIC BLOOD PRESSURE: 122 MMHG | WEIGHT: 255 LBS | DIASTOLIC BLOOD PRESSURE: 82 MMHG

## 2022-05-26 DIAGNOSIS — Z3A.38 38 WEEKS GESTATION OF PREGNANCY: Primary | ICD-10-CM

## 2022-05-26 LAB
EXPIRATION DATE: 0
GLUCOSE UR STRIP-MCNC: NEGATIVE MG/DL
Lab: 0
PROT UR STRIP-MCNC: ABNORMAL MG/DL

## 2022-05-26 PROCEDURE — 0502F SUBSEQUENT PRENATAL CARE: CPT | Performed by: NURSE PRACTITIONER

## 2022-05-26 NOTE — PROGRESS NOTES
OB FOLLOW UP    Keyonna Truong is a 31 y.o.  38w2d patient being seen today for her obstetrical follow up visit. Patient reports heartburn.     Her prenatal care is complicated by (and status) : None    Her kick counts are adequate    ROS -   heartburn   Vaginal bleeding: denies    /82   Wt 116 kg (255 lb)   LMP 2021   BMI 39.05 kg/m²     FHT:  present   Pelvic Exam: Performed: No     Assessment    1. Pregnancy at 38w2d  2. Fetal status reassuring  3. Ultrasound was not performed.     4. TDAP was already given    Problem List Items Addressed This Visit    None     Visit Diagnoses     38 weeks gestation of pregnancy    -  Primary    Relevant Orders    POC Glucose, Urine, Qualitative, Dipstick (Completed)    POC Protein, Urine, Qualitative, Dipstick (Completed)          Plan      1. Reviewed kick counts.  2. Monitor daily fetal movements  3. Labor signs reviewed  4. Follow up: 1 weeks  5. Call for any problems    Elvie Sauceda, APRN  2022

## 2022-06-01 ENCOUNTER — PREP FOR SURGERY (OUTPATIENT)
Dept: OTHER | Facility: HOSPITAL | Age: 32
End: 2022-06-01

## 2022-06-01 ENCOUNTER — ROUTINE PRENATAL (OUTPATIENT)
Dept: OBSTETRICS AND GYNECOLOGY | Facility: CLINIC | Age: 32
End: 2022-06-01

## 2022-06-01 ENCOUNTER — HOSPITAL ENCOUNTER (INPATIENT)
Facility: HOSPITAL | Age: 32
LOS: 3 days | Discharge: HOME | End: 2022-06-04
Attending: OBSTETRICS & GYNECOLOGY | Admitting: OBSTETRICS & GYNECOLOGY

## 2022-06-01 VITALS — SYSTOLIC BLOOD PRESSURE: 120 MMHG | DIASTOLIC BLOOD PRESSURE: 78 MMHG | WEIGHT: 256 LBS | BODY MASS INDEX: 39.21 KG/M2

## 2022-06-01 DIAGNOSIS — Z3A.39 39 WEEKS GESTATION OF PREGNANCY: Primary | ICD-10-CM

## 2022-06-01 DIAGNOSIS — Z3A.39 39 WEEKS GESTATION OF PREGNANCY: ICD-10-CM

## 2022-06-01 PROBLEM — Z34.90 PREGNANT: Status: ACTIVE | Noted: 2022-06-01

## 2022-06-01 LAB
ABO GROUP BLD: NORMAL
BLD GP AB SCN SERPL QL: NEGATIVE
DEPRECATED RDW RBC AUTO: 43.8 FL (ref 37–54)
ERYTHROCYTE [DISTWIDTH] IN BLOOD BY AUTOMATED COUNT: 13.8 % (ref 12.3–15.4)
GLUCOSE UR STRIP-MCNC: NEGATIVE MG/DL
HCT VFR BLD AUTO: 36.7 % (ref 34–46.6)
HGB BLD-MCNC: 12.4 G/DL (ref 12–15.9)
MCH RBC QN AUTO: 29.5 PG (ref 26.6–33)
MCHC RBC AUTO-ENTMCNC: 33.8 G/DL (ref 31.5–35.7)
MCV RBC AUTO: 87.4 FL (ref 79–97)
PLATELET # BLD AUTO: 219 10*3/MM3 (ref 140–450)
PMV BLD AUTO: 11.1 FL (ref 6–12)
PROT UR STRIP-MCNC: ABNORMAL MG/DL
RBC # BLD AUTO: 4.2 10*6/MM3 (ref 3.77–5.28)
RH BLD: POSITIVE
T&S EXPIRATION DATE: NORMAL
WBC NRBC COR # BLD: 11.32 10*3/MM3 (ref 3.4–10.8)

## 2022-06-01 PROCEDURE — 59200 INSERT CERVICAL DILATOR: CPT | Performed by: OBSTETRICS & GYNECOLOGY

## 2022-06-01 PROCEDURE — 85027 COMPLETE CBC AUTOMATED: CPT | Performed by: OBSTETRICS & GYNECOLOGY

## 2022-06-01 PROCEDURE — 86900 BLOOD TYPING SEROLOGIC ABO: CPT

## 2022-06-01 PROCEDURE — 84550 ASSAY OF BLOOD/URIC ACID: CPT | Performed by: OBSTETRICS & GYNECOLOGY

## 2022-06-01 PROCEDURE — 59025 FETAL NON-STRESS TEST: CPT

## 2022-06-01 PROCEDURE — 86900 BLOOD TYPING SEROLOGIC ABO: CPT | Performed by: OBSTETRICS & GYNECOLOGY

## 2022-06-01 PROCEDURE — 82565 ASSAY OF CREATININE: CPT | Performed by: OBSTETRICS & GYNECOLOGY

## 2022-06-01 PROCEDURE — 86901 BLOOD TYPING SEROLOGIC RH(D): CPT | Performed by: OBSTETRICS & GYNECOLOGY

## 2022-06-01 PROCEDURE — 84450 TRANSFERASE (AST) (SGOT): CPT | Performed by: OBSTETRICS & GYNECOLOGY

## 2022-06-01 PROCEDURE — 84460 ALANINE AMINO (ALT) (SGPT): CPT | Performed by: OBSTETRICS & GYNECOLOGY

## 2022-06-01 PROCEDURE — 82247 BILIRUBIN TOTAL: CPT | Performed by: OBSTETRICS & GYNECOLOGY

## 2022-06-01 PROCEDURE — 0502F SUBSEQUENT PRENATAL CARE: CPT | Performed by: OBSTETRICS & GYNECOLOGY

## 2022-06-01 PROCEDURE — 84075 ASSAY ALKALINE PHOSPHATASE: CPT | Performed by: OBSTETRICS & GYNECOLOGY

## 2022-06-01 PROCEDURE — 59025 FETAL NON-STRESS TEST: CPT | Performed by: OBSTETRICS & GYNECOLOGY

## 2022-06-01 PROCEDURE — 86901 BLOOD TYPING SEROLOGIC RH(D): CPT

## 2022-06-01 PROCEDURE — 83615 LACTATE (LD) (LDH) ENZYME: CPT | Performed by: OBSTETRICS & GYNECOLOGY

## 2022-06-01 PROCEDURE — 86850 RBC ANTIBODY SCREEN: CPT | Performed by: OBSTETRICS & GYNECOLOGY

## 2022-06-01 RX ORDER — CARBOPROST TROMETHAMINE 250 UG/ML
250 INJECTION, SOLUTION INTRAMUSCULAR AS NEEDED
Status: CANCELLED | OUTPATIENT
Start: 2022-06-01

## 2022-06-01 RX ORDER — BUTORPHANOL TARTRATE 1 MG/ML
1 INJECTION, SOLUTION INTRAMUSCULAR; INTRAVENOUS
Status: DISCONTINUED | OUTPATIENT
Start: 2022-06-01 | End: 2022-06-04 | Stop reason: HOSPADM

## 2022-06-01 RX ORDER — ASPIRIN 81 MG/1
81 TABLET, CHEWABLE ORAL DAILY
COMMUNITY
End: 2022-06-04 | Stop reason: HOSPADM

## 2022-06-01 RX ORDER — MAGNESIUM CARB/ALUMINUM HYDROX 105-160MG
30 TABLET,CHEWABLE ORAL ONCE
Status: CANCELLED | OUTPATIENT
Start: 2022-06-01 | End: 2022-06-01

## 2022-06-01 RX ORDER — LIDOCAINE HYDROCHLORIDE 10 MG/ML
5 INJECTION, SOLUTION EPIDURAL; INFILTRATION; INTRACAUDAL; PERINEURAL AS NEEDED
Status: CANCELLED | OUTPATIENT
Start: 2022-06-01

## 2022-06-01 RX ORDER — ONDANSETRON 2 MG/ML
4 INJECTION INTRAMUSCULAR; INTRAVENOUS EVERY 6 HOURS PRN
Status: CANCELLED | OUTPATIENT
Start: 2022-06-01

## 2022-06-01 RX ORDER — METHYLERGONOVINE MALEATE 0.2 MG/ML
200 INJECTION INTRAVENOUS ONCE AS NEEDED
Status: CANCELLED | OUTPATIENT
Start: 2022-06-01

## 2022-06-01 RX ORDER — MAGNESIUM CARB/ALUMINUM HYDROX 105-160MG
30 TABLET,CHEWABLE ORAL ONCE
Status: DISCONTINUED | OUTPATIENT
Start: 2022-06-01 | End: 2022-06-02 | Stop reason: HOSPADM

## 2022-06-01 RX ORDER — SODIUM CHLORIDE 0.9 % (FLUSH) 0.9 %
1-10 SYRINGE (ML) INJECTION AS NEEDED
Status: DISCONTINUED | OUTPATIENT
Start: 2022-06-01 | End: 2022-06-02 | Stop reason: HOSPADM

## 2022-06-01 RX ORDER — OXYTOCIN/0.9 % SODIUM CHLORIDE 30/500 ML
2-20 PLASTIC BAG, INJECTION (ML) INTRAVENOUS
Status: DISCONTINUED | OUTPATIENT
Start: 2022-06-01 | End: 2022-06-02 | Stop reason: HOSPADM

## 2022-06-01 RX ORDER — SODIUM CHLORIDE 0.9 % (FLUSH) 0.9 %
10 SYRINGE (ML) INJECTION EVERY 12 HOURS SCHEDULED
Status: DISCONTINUED | OUTPATIENT
Start: 2022-06-01 | End: 2022-06-02 | Stop reason: HOSPADM

## 2022-06-01 RX ORDER — SODIUM CHLORIDE, SODIUM LACTATE, POTASSIUM CHLORIDE, CALCIUM CHLORIDE 600; 310; 30; 20 MG/100ML; MG/100ML; MG/100ML; MG/100ML
125 INJECTION, SOLUTION INTRAVENOUS CONTINUOUS
Status: DISCONTINUED | OUTPATIENT
Start: 2022-06-01 | End: 2022-06-04 | Stop reason: HOSPADM

## 2022-06-01 RX ORDER — OXYTOCIN/0.9 % SODIUM CHLORIDE 30/500 ML
650 PLASTIC BAG, INJECTION (ML) INTRAVENOUS ONCE
Status: CANCELLED | OUTPATIENT
Start: 2022-06-01 | End: 2022-06-01

## 2022-06-01 RX ORDER — HYDROCODONE BITARTRATE AND ACETAMINOPHEN 10; 325 MG/1; MG/1
1 TABLET ORAL EVERY 4 HOURS PRN
Status: CANCELLED | OUTPATIENT
Start: 2022-06-01 | End: 2022-06-11

## 2022-06-01 RX ORDER — LIDOCAINE HYDROCHLORIDE 10 MG/ML
5 INJECTION, SOLUTION EPIDURAL; INFILTRATION; INTRACAUDAL; PERINEURAL AS NEEDED
Status: DISCONTINUED | OUTPATIENT
Start: 2022-06-01 | End: 2022-06-02 | Stop reason: HOSPADM

## 2022-06-01 RX ORDER — IBUPROFEN 600 MG/1
600 TABLET ORAL EVERY 6 HOURS PRN
Status: CANCELLED | OUTPATIENT
Start: 2022-06-01

## 2022-06-01 RX ORDER — ERYTHROMYCIN 5 MG/G
OINTMENT OPHTHALMIC
Status: DISCONTINUED
Start: 2022-06-01 | End: 2022-06-04 | Stop reason: HOSPADM

## 2022-06-01 RX ORDER — EPHEDRINE SULFATE 5 MG/ML
INJECTION INTRAVENOUS
Status: DISCONTINUED
Start: 2022-06-01 | End: 2022-06-04 | Stop reason: HOSPADM

## 2022-06-01 RX ORDER — MISOPROSTOL 200 UG/1
800 TABLET ORAL AS NEEDED
Status: CANCELLED | OUTPATIENT
Start: 2022-06-01

## 2022-06-01 RX ORDER — ACETAMINOPHEN 325 MG/1
650 TABLET ORAL EVERY 4 HOURS PRN
Status: CANCELLED | OUTPATIENT
Start: 2022-06-01

## 2022-06-01 RX ORDER — OXYTOCIN/0.9 % SODIUM CHLORIDE 30/500 ML
2-20 PLASTIC BAG, INJECTION (ML) INTRAVENOUS
Status: CANCELLED | OUTPATIENT
Start: 2022-06-01

## 2022-06-01 RX ORDER — SODIUM CHLORIDE 0.9 % (FLUSH) 0.9 %
10 SYRINGE (ML) INJECTION EVERY 12 HOURS SCHEDULED
Status: CANCELLED | OUTPATIENT
Start: 2022-06-01

## 2022-06-01 RX ORDER — OXYTOCIN/0.9 % SODIUM CHLORIDE 30/500 ML
85 PLASTIC BAG, INJECTION (ML) INTRAVENOUS ONCE
Status: CANCELLED | OUTPATIENT
Start: 2022-06-01 | End: 2022-06-01

## 2022-06-01 RX ORDER — SODIUM CHLORIDE 0.9 % (FLUSH) 0.9 %
1-10 SYRINGE (ML) INJECTION AS NEEDED
Status: CANCELLED | OUTPATIENT
Start: 2022-06-01

## 2022-06-01 RX ORDER — SODIUM CHLORIDE, SODIUM LACTATE, POTASSIUM CHLORIDE, CALCIUM CHLORIDE 600; 310; 30; 20 MG/100ML; MG/100ML; MG/100ML; MG/100ML
125 INJECTION, SOLUTION INTRAVENOUS CONTINUOUS
Status: CANCELLED | OUTPATIENT
Start: 2022-06-01

## 2022-06-01 RX ORDER — ONDANSETRON 4 MG/1
4 TABLET, FILM COATED ORAL EVERY 6 HOURS PRN
Status: CANCELLED | OUTPATIENT
Start: 2022-06-01

## 2022-06-01 RX ADMIN — OXYTOCIN 2 MILLI-UNITS/MIN: 10 INJECTION INTRAVENOUS at 23:22

## 2022-06-01 RX ADMIN — SODIUM CHLORIDE, POTASSIUM CHLORIDE, SODIUM LACTATE AND CALCIUM CHLORIDE 125 ML/HR: 600; 310; 30; 20 INJECTION, SOLUTION INTRAVENOUS at 22:02

## 2022-06-01 NOTE — PROGRESS NOTES
OB FOLLOW UP    Keyonna Truong is a 31 y.o.  39w1d patient being seen today for her obstetrical follow up visit. Patient reports no complaints. Pt feels well, good fm, no ctx.  She is interested in scheduling induction.    Her prenatal care is complicated by (and status) :    Patient Active Problem List   Diagnosis   • Ovarian mass, right   • Complex ovarian cyst   • Right ovarian cyst   • 20 weeks gestation of pregnancy   • 28 weeks gestation of pregnancy       ROS -   Patient Reports : No Problems  Patient Denies:  Loss of Fluid, Vaginal Spotting, Vision Changes, Headaches and Cramping/Contractions   Fetal Movement : normal    I have reviewed and agree with the HPI, ROS, and historical information as entered above. Radha Capps MD    /78   Wt 116 kg (256 lb)   LMP 2021   BMI 39.21 kg/m²     Ultrasound Today: no    EXAM:  Vitals: See prenatal flowsheet   Abdomen: See prenatal flowsheet   Urine glucose/protein: See prenatal flowsheet   HRT: See prenatal flowsheet   Presentation: See prenatal flowsheet       Pelvic:      NST NOTE    Indiction :   H/o COVID  FHR:          Reactive, Cat 1, No decels  Contractions:    Irregular  Time Monitored:   > 20 minutes    Assessment    1. Pregnancy at 39w1d  2. Fetal status reassuring   3. COVID earlier in pregnancy    Problem List Items Addressed This Visit    None     Visit Diagnoses     39 weeks gestation of pregnancy    -  Primary    Relevant Orders    POC Urinalysis Dipstick (Completed)          Plan    1. Options discussed for induction.  SHe will take tonight as there are opening.  2. NST rx.  3. Discussed induction process and benefits/risks based on the ARRIVE study.    Radha Capps MD  2022

## 2022-06-02 ENCOUNTER — ANESTHESIA EVENT (OUTPATIENT)
Dept: LABOR AND DELIVERY | Facility: HOSPITAL | Age: 32
End: 2022-06-02

## 2022-06-02 ENCOUNTER — ANESTHESIA (OUTPATIENT)
Dept: LABOR AND DELIVERY | Facility: HOSPITAL | Age: 32
End: 2022-06-02

## 2022-06-02 PROBLEM — Z34.90 PREGNANT: Status: RESOLVED | Noted: 2022-06-01 | Resolved: 2022-06-02

## 2022-06-02 LAB
ABO GROUP BLD: NORMAL
ALP SERPL-CCNC: 122 U/L (ref 39–117)
ALT SERPL W P-5'-P-CCNC: 9 U/L (ref 1–33)
AST SERPL-CCNC: 12 U/L (ref 1–32)
BILIRUB SERPL-MCNC: <0.2 MG/DL (ref 0–1.2)
CREAT SERPL-MCNC: 0.53 MG/DL (ref 0.57–1)
LDH SERPL-CCNC: 196 U/L (ref 135–214)
RH BLD: POSITIVE
URATE SERPL-MCNC: 6.2 MG/DL (ref 2.4–5.7)

## 2022-06-02 PROCEDURE — 3E033VJ INTRODUCTION OF OTHER HORMONE INTO PERIPHERAL VEIN, PERCUTANEOUS APPROACH: ICD-10-PCS | Performed by: OBSTETRICS & GYNECOLOGY

## 2022-06-02 PROCEDURE — 0KQM0ZZ REPAIR PERINEUM MUSCLE, OPEN APPROACH: ICD-10-PCS | Performed by: OBSTETRICS & GYNECOLOGY

## 2022-06-02 PROCEDURE — 59025 FETAL NON-STRESS TEST: CPT

## 2022-06-02 PROCEDURE — 10907ZC DRAINAGE OF AMNIOTIC FLUID, THERAPEUTIC FROM PRODUCTS OF CONCEPTION, VIA NATURAL OR ARTIFICIAL OPENING: ICD-10-PCS | Performed by: OBSTETRICS & GYNECOLOGY

## 2022-06-02 PROCEDURE — 25010000002 ONDANSETRON PER 1 MG: Performed by: NURSE ANESTHETIST, CERTIFIED REGISTERED

## 2022-06-02 PROCEDURE — 25010000002 ROPIVACAINE PER 1 MG: Performed by: NURSE ANESTHETIST, CERTIFIED REGISTERED

## 2022-06-02 PROCEDURE — C1755 CATHETER, INTRASPINAL: HCPCS | Performed by: ANESTHESIOLOGY

## 2022-06-02 PROCEDURE — 0UQMXZZ REPAIR VULVA, EXTERNAL APPROACH: ICD-10-PCS | Performed by: OBSTETRICS & GYNECOLOGY

## 2022-06-02 PROCEDURE — 51703 INSERT BLADDER CATH COMPLEX: CPT

## 2022-06-02 PROCEDURE — 25010000002 FENTANYL CITRATE (PF) 50 MCG/ML SOLUTION: Performed by: NURSE ANESTHETIST, CERTIFIED REGISTERED

## 2022-06-02 PROCEDURE — 25010000002 BUTORPHANOL PER 1 MG: Performed by: OBSTETRICS & GYNECOLOGY

## 2022-06-02 PROCEDURE — C1755 CATHETER, INTRASPINAL: HCPCS

## 2022-06-02 RX ORDER — PRENATAL VIT/IRON FUM/FOLIC AC 27MG-0.8MG
1 TABLET ORAL DAILY
Status: DISCONTINUED | OUTPATIENT
Start: 2022-06-02 | End: 2022-06-04 | Stop reason: HOSPADM

## 2022-06-02 RX ORDER — FENTANYL CITRATE 50 UG/ML
INJECTION, SOLUTION INTRAMUSCULAR; INTRAVENOUS AS NEEDED
Status: DISCONTINUED | OUTPATIENT
Start: 2022-06-02 | End: 2022-06-02 | Stop reason: SURG

## 2022-06-02 RX ORDER — LIDOCAINE HYDROCHLORIDE AND EPINEPHRINE 15; 5 MG/ML; UG/ML
INJECTION, SOLUTION EPIDURAL AS NEEDED
Status: DISCONTINUED | OUTPATIENT
Start: 2022-06-02 | End: 2022-06-02 | Stop reason: SURG

## 2022-06-02 RX ORDER — MISOPROSTOL 200 UG/1
800 TABLET ORAL AS NEEDED
Status: DISCONTINUED | OUTPATIENT
Start: 2022-06-02 | End: 2022-06-02 | Stop reason: HOSPADM

## 2022-06-02 RX ORDER — ONDANSETRON 4 MG/1
4 TABLET, FILM COATED ORAL EVERY 8 HOURS PRN
Status: DISCONTINUED | OUTPATIENT
Start: 2022-06-02 | End: 2022-06-04 | Stop reason: HOSPADM

## 2022-06-02 RX ORDER — IBUPROFEN 600 MG/1
600 TABLET ORAL EVERY 6 HOURS PRN
Status: DISCONTINUED | OUTPATIENT
Start: 2022-06-02 | End: 2022-06-02 | Stop reason: HOSPADM

## 2022-06-02 RX ORDER — OXYTOCIN/0.9 % SODIUM CHLORIDE 30/500 ML
85 PLASTIC BAG, INJECTION (ML) INTRAVENOUS ONCE
Status: DISCONTINUED | OUTPATIENT
Start: 2022-06-02 | End: 2022-06-02 | Stop reason: HOSPADM

## 2022-06-02 RX ORDER — ONDANSETRON 4 MG/1
4 TABLET, FILM COATED ORAL EVERY 6 HOURS PRN
Status: DISCONTINUED | OUTPATIENT
Start: 2022-06-02 | End: 2022-06-02 | Stop reason: HOSPADM

## 2022-06-02 RX ORDER — ROPIVACAINE HYDROCHLORIDE 2 MG/ML
15 INJECTION, SOLUTION EPIDURAL; INFILTRATION; PERINEURAL CONTINUOUS
Status: DISCONTINUED | OUTPATIENT
Start: 2022-06-02 | End: 2022-06-04 | Stop reason: HOSPADM

## 2022-06-02 RX ORDER — DOCUSATE SODIUM 100 MG/1
100 CAPSULE, LIQUID FILLED ORAL 2 TIMES DAILY
Status: DISCONTINUED | OUTPATIENT
Start: 2022-06-02 | End: 2022-06-04 | Stop reason: HOSPADM

## 2022-06-02 RX ORDER — IBUPROFEN 600 MG/1
600 TABLET ORAL EVERY 6 HOURS PRN
Status: DISCONTINUED | OUTPATIENT
Start: 2022-06-02 | End: 2022-06-04 | Stop reason: HOSPADM

## 2022-06-02 RX ORDER — METOCLOPRAMIDE HYDROCHLORIDE 5 MG/ML
10 INJECTION INTRAMUSCULAR; INTRAVENOUS ONCE AS NEEDED
Status: DISCONTINUED | OUTPATIENT
Start: 2022-06-02 | End: 2022-06-02 | Stop reason: HOSPADM

## 2022-06-02 RX ORDER — EPHEDRINE SULFATE 5 MG/ML
10 INJECTION INTRAVENOUS
Status: DISCONTINUED | OUTPATIENT
Start: 2022-06-02 | End: 2022-06-02 | Stop reason: HOSPADM

## 2022-06-02 RX ORDER — CARBOPROST TROMETHAMINE 250 UG/ML
250 INJECTION, SOLUTION INTRAMUSCULAR AS NEEDED
Status: DISCONTINUED | OUTPATIENT
Start: 2022-06-02 | End: 2022-06-02 | Stop reason: HOSPADM

## 2022-06-02 RX ORDER — DIPHENHYDRAMINE HCL 25 MG
25 CAPSULE ORAL NIGHTLY PRN
Status: DISCONTINUED | OUTPATIENT
Start: 2022-06-02 | End: 2022-06-04 | Stop reason: HOSPADM

## 2022-06-02 RX ORDER — HYDROCODONE BITARTRATE AND ACETAMINOPHEN 5; 325 MG/1; MG/1
1 TABLET ORAL EVERY 4 HOURS PRN
Status: DISCONTINUED | OUTPATIENT
Start: 2022-06-02 | End: 2022-06-04 | Stop reason: HOSPADM

## 2022-06-02 RX ORDER — ONDANSETRON 2 MG/ML
4 INJECTION INTRAMUSCULAR; INTRAVENOUS EVERY 6 HOURS PRN
Status: DISCONTINUED | OUTPATIENT
Start: 2022-06-02 | End: 2022-06-02 | Stop reason: HOSPADM

## 2022-06-02 RX ORDER — HYDROCODONE BITARTRATE AND ACETAMINOPHEN 10; 325 MG/1; MG/1
1 TABLET ORAL EVERY 4 HOURS PRN
Status: DISCONTINUED | OUTPATIENT
Start: 2022-06-02 | End: 2022-06-02 | Stop reason: HOSPADM

## 2022-06-02 RX ORDER — HYDROCORTISONE 25 MG/G
1 CREAM TOPICAL AS NEEDED
Status: DISCONTINUED | OUTPATIENT
Start: 2022-06-02 | End: 2022-06-04 | Stop reason: HOSPADM

## 2022-06-02 RX ORDER — SIMETHICONE 80 MG
80 TABLET,CHEWABLE ORAL 4 TIMES DAILY
Status: DISCONTINUED | OUTPATIENT
Start: 2022-06-02 | End: 2022-06-04 | Stop reason: HOSPADM

## 2022-06-02 RX ORDER — DIPHENHYDRAMINE HYDROCHLORIDE 50 MG/ML
12.5 INJECTION INTRAMUSCULAR; INTRAVENOUS EVERY 8 HOURS PRN
Status: DISCONTINUED | OUTPATIENT
Start: 2022-06-02 | End: 2022-06-02 | Stop reason: HOSPADM

## 2022-06-02 RX ORDER — BISACODYL 10 MG
10 SUPPOSITORY, RECTAL RECTAL DAILY PRN
Status: DISCONTINUED | OUTPATIENT
Start: 2022-06-03 | End: 2022-06-04 | Stop reason: HOSPADM

## 2022-06-02 RX ORDER — METHYLERGONOVINE MALEATE 0.2 MG/ML
200 INJECTION INTRAVENOUS ONCE AS NEEDED
Status: DISCONTINUED | OUTPATIENT
Start: 2022-06-02 | End: 2022-06-02 | Stop reason: HOSPADM

## 2022-06-02 RX ORDER — SODIUM CHLORIDE 0.9 % (FLUSH) 0.9 %
1-10 SYRINGE (ML) INJECTION AS NEEDED
Status: DISCONTINUED | OUTPATIENT
Start: 2022-06-02 | End: 2022-06-04 | Stop reason: HOSPADM

## 2022-06-02 RX ORDER — OXYTOCIN/0.9 % SODIUM CHLORIDE 30/500 ML
650 PLASTIC BAG, INJECTION (ML) INTRAVENOUS ONCE
Status: DISCONTINUED | OUTPATIENT
Start: 2022-06-02 | End: 2022-06-02 | Stop reason: HOSPADM

## 2022-06-02 RX ORDER — TRISODIUM CITRATE DIHYDRATE AND CITRIC ACID MONOHYDRATE 500; 334 MG/5ML; MG/5ML
30 SOLUTION ORAL ONCE
Status: DISCONTINUED | OUTPATIENT
Start: 2022-06-02 | End: 2022-06-02 | Stop reason: HOSPADM

## 2022-06-02 RX ORDER — FAMOTIDINE 10 MG/ML
20 INJECTION, SOLUTION INTRAVENOUS ONCE AS NEEDED
Status: DISCONTINUED | OUTPATIENT
Start: 2022-06-02 | End: 2022-06-02 | Stop reason: HOSPADM

## 2022-06-02 RX ORDER — ACETAMINOPHEN 325 MG/1
650 TABLET ORAL EVERY 4 HOURS PRN
Status: DISCONTINUED | OUTPATIENT
Start: 2022-06-02 | End: 2022-06-02 | Stop reason: HOSPADM

## 2022-06-02 RX ORDER — ACETAMINOPHEN 325 MG/1
650 TABLET ORAL EVERY 4 HOURS PRN
Status: DISCONTINUED | OUTPATIENT
Start: 2022-06-02 | End: 2022-06-04 | Stop reason: HOSPADM

## 2022-06-02 RX ORDER — ONDANSETRON 2 MG/ML
4 INJECTION INTRAMUSCULAR; INTRAVENOUS ONCE AS NEEDED
Status: COMPLETED | OUTPATIENT
Start: 2022-06-02 | End: 2022-06-02

## 2022-06-02 RX ADMIN — BUTORPHANOL TARTRATE 1 MG: 1 INJECTION, SOLUTION INTRAMUSCULAR; INTRAVENOUS at 03:43

## 2022-06-02 RX ADMIN — BUTORPHANOL TARTRATE 1 MG: 1 INJECTION, SOLUTION INTRAMUSCULAR; INTRAVENOUS at 00:52

## 2022-06-02 RX ADMIN — LIDOCAINE HYDROCHLORIDE AND EPINEPHRINE 3 ML: 15; 5 INJECTION, SOLUTION EPIDURAL at 09:16

## 2022-06-02 RX ADMIN — SODIUM CHLORIDE, POTASSIUM CHLORIDE, SODIUM LACTATE AND CALCIUM CHLORIDE 4000 ML/HR: 600; 310; 30; 20 INJECTION, SOLUTION INTRAVENOUS at 09:03

## 2022-06-02 RX ADMIN — ONDANSETRON 4 MG: 2 INJECTION INTRAMUSCULAR; INTRAVENOUS at 14:59

## 2022-06-02 RX ADMIN — HYDROCODONE BITARTRATE AND ACETAMINOPHEN 1 TABLET: 5; 325 TABLET ORAL at 22:50

## 2022-06-02 RX ADMIN — Medication: at 20:07

## 2022-06-02 RX ADMIN — ACETAMINOPHEN 650 MG: 325 TABLET ORAL at 20:07

## 2022-06-02 RX ADMIN — ROPIVACAINE HYDROCHLORIDE 10 ML: 5 INJECTION, SOLUTION EPIDURAL; INFILTRATION; PERINEURAL at 09:20

## 2022-06-02 RX ADMIN — ROPIVACAINE HYDROCHLORIDE 15 ML/HR: 2 INJECTION, SOLUTION EPIDURAL; INFILTRATION at 15:43

## 2022-06-02 RX ADMIN — FENTANYL CITRATE 100 MCG: 50 INJECTION, SOLUTION INTRAMUSCULAR; INTRAVENOUS at 09:18

## 2022-06-02 RX ADMIN — ROPIVACAINE HYDROCHLORIDE 15 ML/HR: 2 INJECTION, SOLUTION EPIDURAL; INFILTRATION at 09:22

## 2022-06-02 RX ADMIN — SODIUM CHLORIDE, POTASSIUM CHLORIDE, SODIUM LACTATE AND CALCIUM CHLORIDE 125 ML/HR: 600; 310; 30; 20 INJECTION, SOLUTION INTRAVENOUS at 05:45

## 2022-06-02 RX ADMIN — WITCH HAZEL 1 PAD: 500 SOLUTION RECTAL; TOPICAL at 20:07

## 2022-06-02 RX ADMIN — BUTORPHANOL TARTRATE 1 MG: 1 INJECTION, SOLUTION INTRAMUSCULAR; INTRAVENOUS at 05:50

## 2022-06-02 RX ADMIN — Medication 1 APPLICATION: at 20:07

## 2022-06-02 RX ADMIN — SODIUM CHLORIDE, POTASSIUM CHLORIDE, SODIUM LACTATE AND CALCIUM CHLORIDE 125 ML/HR: 600; 310; 30; 20 INJECTION, SOLUTION INTRAVENOUS at 09:30

## 2022-06-02 RX ADMIN — IBUPROFEN 600 MG: 600 TABLET ORAL at 18:23

## 2022-06-02 NOTE — PROCEDURES
"Patient admitted for elective induction of labor at 39 weeks 1 day gestation.  Received request for placement of transcervical West bulb for cervical ripening.  Cervical examination: 1 cm / 50%/-3 station (cervix posterior and medium texture).  Cephalic presentation confirmed by bedside ultrasound.  Transcervical West placed per physician request.  FHT's class 1.  Patient tolerated well. 24 Danish, 60ml.    Luca Sea \"Rayle\" SOO Piedra MD  6/1/2022  23:21 EDT        "

## 2022-06-02 NOTE — ANESTHESIA PROCEDURE NOTES
Labor Epidural      Patient reassessed immediately prior to procedure    Patient location during procedure: OB  Performed By  CRNA/CAA: Steffanie Cerrato CRNA  Preanesthetic Checklist  Completed: patient identified, IV checked, risks and benefits discussed, surgical consent, monitors and equipment checked, pre-op evaluation and timeout performed  Prep:  Pt Position:sitting  Sterile Tech:cap, gloves, mask and sterile barrier  Prep:DuraPrep  Monitoring:blood pressure monitoring  Epidural Block Procedure:  Approach:midline  Guidance:palpation technique  Location:L3-L4  Needle Type:Tuohy  Needle Gauge:18 G  Loss of Resistance Medium: saline  Loss of Resistance: 8cm  Cath Depth at skin:13 cm  Paresthesia: none  Aspiration:negative  Test Dose:negative  Number of Attempts: 1  Post Assessment:  Dressing:occlusive dressing applied and secured with tape  Pt Tolerance:patient tolerated the procedure well with no apparent complications  Complications:no

## 2022-06-02 NOTE — ANESTHESIA PREPROCEDURE EVALUATION
Anesthesia Evaluation     Patient summary reviewed and Nursing notes reviewed   NPO Solid Status: > 6 hours  NPO Liquid Status: > 6 hours           Airway   Dental      Pulmonary - negative pulmonary ROS   Cardiovascular - negative cardio ROS        Neuro/Psych  (+) psychiatric history Anxiety and ADHD,    GI/Hepatic/Renal/Endo    (+) obesity, morbid obesity,      Musculoskeletal (-) negative ROS    Abdominal    Substance History - negative use     OB/GYN    (+) Pregnant,         Other                        Anesthesia Plan    ASA 3     epidural       Anesthetic plan, all risks, benefits, and alternatives have been provided, discussed and informed consent has been obtained with: patient.        CODE STATUS:    Level Of Support Discussed With: Patient  Code Status (Patient has no pulse and is not breathing): CPR (Attempt to Resuscitate)  Medical Interventions (Patient has pulse or is breathing): Full Support

## 2022-06-02 NOTE — H&P
"History and Physical  [facility]    Chief Complaint   Patient presents with   • Scheduled Induction       Patient Active Problem List   Diagnosis   • Ovarian mass, right   • Complex ovarian cyst   • Right ovarian cyst   • 20 weeks gestation of pregnancy   • 28 weeks gestation of pregnancy   • Pregnant       Keyonna Truong is a 31 y.o. year old  with an Estimated Date of Delivery: 22 currently at 39w2d presenting for risk reducing induction.    Prenatal care has been with Dr. Capps.  It has been significant for COVID at 32 weeks, known endometrioma..    No Additional Complaints Reported    The following portions of the patient's history were reviewed and updated as appropriate:vital signs, allergies, current medications, past medical history, past social history, past surgical history and problem list.    Review of Systems  Pertinent items are noted in HPI.     Objective     /80   Pulse 86   Temp 98.3 °F (36.8 °C) (Oral)   Resp 16   Ht 171.5 cm (67.5\")   Wt 117 kg (257 lb)   LMP 2021   Breastfeeding Yes   BMI 39.66 kg/m²     Physical Exam    General:  well developed; well nourished  no acute distress           Abdomen: no umbilical or inguinal hernias are present  no hepato-splenomegaly       FHT's: reactive and category 1   Cervix: 50   [place]: Contraction are irregular     Lab Review   Labs: No data reviewed   Lab Results (last 24 hours)     Procedure Component Value Units Date/Time    Preeclampsia Panel [[id number]]  (Abnormal) Collected: 22 2348    Specimen: Blood Updated: 22 0048     Alkaline Phosphatase 122 U/L      ALT (SGPT) 9 U/L      AST (SGOT) 12 U/L      Creatinine 0.53 mg/dL      Total Bilirubin <0.2 mg/dL       U/L      Uric Acid 6.2 mg/dL     CBC (No Diff) [[id number]]  (Abnormal) Collected: 22 2159    Specimen: Blood Updated: 22     WBC 11.32 10*3/mm3      RBC 4.20 10*6/mm3      Hemoglobin 12.4 g/dL      Hematocrit " 36.7 %      MCV 87.4 fL      MCH 29.5 pg      MCHC 33.8 g/dL      RDW 13.8 %      RDW-SD 43.8 fl      MPV 11.1 fL      Platelets 219 10*3/mm3           Imaging   No data reviewed   Imaging Results (Most Recent)     None        Assessment & Plan     ASSESSMENT  1. IUP at 39w2d  2. Desires risk reducing induciton  3. GBS neg    PLAN  1. Admit for cervical ripening and pitocin induction         Radha Capps MD  6/2/202210:06 EDT

## 2022-06-03 LAB
HCT VFR BLD AUTO: 28.6 % (ref 34–46.6)
HGB BLD-MCNC: 9.4 G/DL (ref 12–15.9)

## 2022-06-03 PROCEDURE — 85018 HEMOGLOBIN: CPT | Performed by: STUDENT IN AN ORGANIZED HEALTH CARE EDUCATION/TRAINING PROGRAM

## 2022-06-03 PROCEDURE — 85014 HEMATOCRIT: CPT | Performed by: STUDENT IN AN ORGANIZED HEALTH CARE EDUCATION/TRAINING PROGRAM

## 2022-06-03 PROCEDURE — 59400 OBSTETRICAL CARE: CPT | Performed by: OBSTETRICS & GYNECOLOGY

## 2022-06-03 RX ADMIN — HYDROCODONE BITARTRATE AND ACETAMINOPHEN 1 TABLET: 5; 325 TABLET ORAL at 13:30

## 2022-06-03 RX ADMIN — HYDROCODONE BITARTRATE AND ACETAMINOPHEN 1 TABLET: 5; 325 TABLET ORAL at 08:05

## 2022-06-03 RX ADMIN — HYDROCODONE BITARTRATE AND ACETAMINOPHEN 1 TABLET: 5; 325 TABLET ORAL at 03:44

## 2022-06-03 RX ADMIN — HYDROCODONE BITARTRATE AND ACETAMINOPHEN 1 TABLET: 5; 325 TABLET ORAL at 22:31

## 2022-06-03 RX ADMIN — DOCUSATE SODIUM 100 MG: 100 CAPSULE, LIQUID FILLED ORAL at 20:35

## 2022-06-03 RX ADMIN — PRENATAL VITAMINS-IRON FUMARATE 27 MG IRON-FOLIC ACID 0.8 MG TABLET 1 TABLET: at 08:05

## 2022-06-03 RX ADMIN — IBUPROFEN 600 MG: 600 TABLET ORAL at 01:19

## 2022-06-03 RX ADMIN — DOCUSATE SODIUM 100 MG: 100 CAPSULE, LIQUID FILLED ORAL at 08:05

## 2022-06-03 RX ADMIN — IBUPROFEN 600 MG: 600 TABLET ORAL at 11:08

## 2022-06-03 RX ADMIN — IBUPROFEN 600 MG: 600 TABLET ORAL at 18:05

## 2022-06-03 RX ADMIN — IBUPROFEN 600 MG: 600 TABLET ORAL at 06:32

## 2022-06-03 RX ADMIN — HYDROCODONE BITARTRATE AND ACETAMINOPHEN 1 TABLET: 5; 325 TABLET ORAL at 18:05

## 2022-06-03 NOTE — L&D DELIVERY NOTE
[facility]  Vaginal Delivery Note   Review the Delivery Report for details.       Delivery     Delivery: Vaginal, Spontaneous     YOB: 2023    Time of Birth:  Gestational Age 4:58 PM   39w2d     Anesthesia: Epidural     Delivering clinician: Radha Capps    Forceps?   No   Vacuum? No    Shoulder dystocia present: No        Delivery narrative:   over small second degree laceration repaired with 2-0 Vicryl and periurethral on right repair with 3/0 chromic.   Rectal mucosa and sphincter intact.  Placenta spontaneous and intact.  Uterus explored and empty.        Infant    Findings: female  infant     Infant observations: Weight: 3615 g (7 lb 15.5 oz)   Length: 20.5  in  Observations/Comments:        Apgars: 8  @ 1 minute /    9  @ 5 minutes   Infant Name:      Placenta, Cord, and Fluid    Placenta delivered  Spontaneous  at   2022  5:02 PM     Cord: 3 vessels  present.   Nuchal Cord?  no   Cord blood obtained: Yes    Cord gases obtained:  No    Cord gas results: Venous:  No results found for: PHCVEN    Arterial:  No results found for: PHCART     Repair    Episiotomy: None     No        Estimated Blood Loss:  450               Quantitative Blood Loss:    QBL from VAG DEL: 444 (22)             Complications  none    Disposition  Mother to Mother Baby/Postpartum  in stable condition currently.  Baby to remains with mom  in stable condition currently.      Radha Capps MD  22  13:21 EDT

## 2022-06-03 NOTE — LACTATION NOTE
06/03/22 0800   Maternal Information   Date of Referral 06/03/22   Person Making Referral lactation consultant   Maternal Reason for Referral no prior breastfeeding experience   Infant Reason for Referral no latch achieved;disinterest in latch  (baby seems to have a recessed chin and possible high pallat)   Maternal Assessment   Breast Size Issue none   Breast Shape Bilateral:;round   Breast Density Bilateral:;soft   Nipples Bilateral:;short;retracting  (with compression)   Left Nipple Symptoms intact;nontender   Right Nipple Symptoms intact;nontender   Maternal Infant Feeding   Maternal Emotional State independent;receptive;anxious   Infant Positioning cross-cradle;clutch/football  (left)   Signs of Milk Transfer deep jaw excursions noted   Pain with Feeding no  (per pt report)   Comfort Measures Before/During Feeding infant position adjusted;latch adjusted;maternal position adjusted  (medium nipple shield with proper nipple shield placement education/demonstrated completed)   Latch Assistance full assistance needed   Support Person Involvement actively supporting mother   Milk Expression/Equipment   Breast Pump Type double electric, personal  (Spectra S2 at bedside)   Breast Pumping   Breast Pumping Interventions post-feed pumping encouraged  (for short/missed feedings or if supplementation is required to encourage breastmilk production)

## 2022-06-03 NOTE — PROGRESS NOTES
6/3/2022  PPD #1    Subjective   Keyonna feels well.  Patient describes her lochia less than menses.  Pain is well controlled       Objective   Temp: Temp:  [97.7 °F (36.5 °C)-98.6 °F (37 °C)] 98.1 °F (36.7 °C) Temp src: Oral   BP: BP: (101-150)/(50-85) 110/57        Pulse: Heart Rate:  [] 84  RR: Resp:  [15-18] 18    General:  No acute distress   Abdomen: Fundus firm and beneath umbilicus   Pelvis: deferred     Lab Results   Component Value Date    WBC 11.32 (H) 06/01/2022    HGB 12.4 06/01/2022    HCT 36.7 06/01/2022    MCV 87.4 06/01/2022     06/01/2022    HEPBSAG Negative 10/28/2021       Assessment  1. PPD# 1 after vaginal delivery, doing well; am labs pending    Plan  1. Routine postpartum care.      This note has been electronically signed.    Ivette Reno, APRN  Bel 3, 2022

## 2022-06-03 NOTE — ANESTHESIA POSTPROCEDURE EVALUATION
Patient: Keyonna Truong    Procedure Summary     Date: 06/02/22 Room / Location:     Anesthesia Start: 0903 Anesthesia Stop: 1658    Procedure: LABOR ANALGESIA Diagnosis:     Scheduled Providers:  Provider: Carter Helton MD    Anesthesia Type: epidural ASA Status: 3          Anesthesia Type: epidural    Vitals  Vitals Value Taken Time   /57 06/03/22 0755   Temp 98.1 °F (36.7 °C) 06/03/22 0755   Pulse 84 06/03/22 0755   Resp 18 06/03/22 0755   SpO2             Post Anesthesia Care and Evaluation    Patient location during evaluation: bedside  Patient participation: complete - patient participated  Level of consciousness: awake and alert  Pain management: adequate  Airway patency: patent  Anesthetic complications: No anesthetic complications    Cardiovascular status: acceptable  Respiratory status: acceptable  Hydration status: acceptable  Post Neuraxial Block status: Motor and sensory function returned to baseline and No signs or symptoms of PDPH

## 2022-06-04 VITALS
RESPIRATION RATE: 16 BRPM | HEART RATE: 80 BPM | BODY MASS INDEX: 38.95 KG/M2 | TEMPERATURE: 98.3 F | WEIGHT: 257 LBS | HEIGHT: 68 IN | DIASTOLIC BLOOD PRESSURE: 78 MMHG | SYSTOLIC BLOOD PRESSURE: 133 MMHG

## 2022-06-04 PROBLEM — Z3A.20 20 WEEKS GESTATION OF PREGNANCY: Status: RESOLVED | Noted: 2022-01-24 | Resolved: 2022-06-04

## 2022-06-04 PROBLEM — Z3A.28 28 WEEKS GESTATION OF PREGNANCY: Status: RESOLVED | Noted: 2022-03-21 | Resolved: 2022-06-04

## 2022-06-04 PROCEDURE — 0503F POSTPARTUM CARE VISIT: CPT | Performed by: NURSE PRACTITIONER

## 2022-06-04 RX ADMIN — IBUPROFEN 600 MG: 600 TABLET ORAL at 04:22

## 2022-06-04 RX ADMIN — DOCUSATE SODIUM 100 MG: 100 CAPSULE, LIQUID FILLED ORAL at 09:02

## 2022-06-04 RX ADMIN — SIMETHICONE 80 MG: 80 TABLET, CHEWABLE ORAL at 09:02

## 2022-06-04 RX ADMIN — HYDROCODONE BITARTRATE AND ACETAMINOPHEN 1 TABLET: 5; 325 TABLET ORAL at 04:22

## 2022-06-04 NOTE — DISCHARGE SUMMARY
[facility]  Delivery Discharge Summary    Primary OB Clinician: Dr Capps    EDC: Estimated Date of Delivery: 22    Gestational Age:39w2d    Antepartum complications: none    Date of Delivery: 2022   Time of Delivery: 4:58 PM     Delivered By:  Radha Capps     Delivery Type: Vaginal, Spontaneous      Tubal Ligation: n/a    Baby:female  infant;   Apgar:  8  @ 1 minute /   Apgar:  9  @ 5 minutes   Weight: 3615 g (7 lb 15.5 oz)    Length: 20.5     Anesthesia: Epidural      Intrapartum complications: None    Laceration: Yes  Laceration Information  Laceration Repaired?   Perineal: 2nd  Yes    Periurethral: right  Yes    Labial:       Sulcus:       Vaginal:       Cervical:             Episiotomy: No    Placenta: Spontaneous     Feeding method: Breastfeeding Status: Yes    Rh Immune globulin given: not applicable    Rubella vaccine given: not applicable    Discharge Date: 2022; Discharge Time: 10:54 EDT    Early Discharge:  NO    Plan:    Address and phone number verified and same.  Follow-up appointment with Dr Capps in 6 weeks.  Postpartum depression, mastitis and lochia warning signs reviewed.  Pelvic rest for 6 weeks.

## 2022-06-04 NOTE — PLAN OF CARE
Goal Outcome Evaluation:      Vitals stable, tolerating PO, voiding w/out difficulty, light lochia, no s/s of infection

## 2022-06-08 ENCOUNTER — HOSPITAL ENCOUNTER (OUTPATIENT)
Dept: LACTATION | Facility: HOSPITAL | Age: 32
Discharge: HOME | End: 2022-06-08

## 2022-06-08 NOTE — LACTATION NOTE
06/08/22 1630   Maternal Information   Date of Referral 06/08/22   Person Making Referral patient   Maternal Reason for Referral no prior breastfeeding experience  ( in hospital but baby had trouble breastfeeding and has been pumping and bottle feeding)   Infant Reason for Referral   (baby lost too much weight and mom had to supplement and pump. Gave formula until milk came in and now giving expressed breast milk.)   Maternal Assessment   Breast Size Issue none   Breast Shape Bilateral:;round   Breast Density Bilateral:;full   Nipples Bilateral:;graspable;short   Left Nipple Symptoms intact   Right Nipple Symptoms intact   Maternal Infant Feeding   Maternal Emotional State receptive;tense   Infant Positioning cross-cradle  (right)   Signs of Milk Transfer   (no audible swallowing and shallow jaw excursions)   Pain with Feeding no   Comfort Measures Before/During Feeding infant position adjusted;latch adjusted;maternal position adjusted  (medium nipple shield)   Milk Ejection Reflex absent   Nipple Shape After Feeding, Right round;symmetrical;appropriately projected   Latch Assistance full assistance needed   Support Person Involvement actively supporting mother   Feeding Infant   Feeding Readiness Cues energy for feeding;finger sucking;hand to mouth movements   Feeding Tolerance/Success   (interested in feeding; would stay on with a couple sucks of the bottle and milk dripped. Would latch for a few minutes but then would get fussy and come off. Able to keep on for 15 minutes)   Effective Latch During Feeding yes   Skin-to-Skin Length of Time   (15 minutes)   Skin-to-Skin Contact, Duration   (15 minutes)   Prefeeding Weight (gm)   (3400 grams)   Postfeeding Weight (gm)   (70481 grams)   Weight Gain/Loss (gm)    (5 grams)   Breastfeeding Session   Breastfeeding breastfeeding, right side only   Breastfeeding Time, Right (min)   (15 minutes)   Milk Expression/Equipment   Breast Pump Type double electric,  personal  (using spectra S2 breast pump)   Breast Pump Flange Type hard   Breast Pump Flange Size 24 mm   Breast Pumping   Breast Pumping Interventions post-feed pumping encouraged   REASON FOR VISIT:  Baby 6 days old. Milk in. Pumping every 3 hours and getting 2-3 oz with each pumping. God 4 oz at a pumping this morning. Baby is just taking bottles. Giving 45 ml of expressed breast milk every 3 hours.     Here today to try to get baby back to the breast. Was breastfeeding in the hospital. Had to start pumping and supplementing because baby lost too much weight. Baby quit going to the breast--preferring the bottle. Using Dr Ceballos's bottle.    States pediatrician recommended they ask lactation services to evaluate for oral ties.     EVALUATION:  Breasts full.    Cross cradle on the right with a medium nipple shield. Able to get baby to the breast using a couple sucks from the bottle at a time. Baby stayed on the breast for 4-5 minutes at a time.     Baby got a total of 5 ml with 15 minutes of breastfeeding.     Tucks in top lip, over uses lips during feeding, shallow jaw excursions. Narrow gape. On oral evaluation--baby not cupping tongue, felt connective tissue connecting tongue to floor of mouth and possible lip tie.     Pediatric speech language pathologist is not available at this time for consult.     PLAN:  Recommend further oral evaluation by pediatric dentist or ENT. Can discuss further with pediatrician and family.    Continue pumping every 3 hours   Continue bottle feeding expressed breast feeding,or formula, 1 1/2 to 2 1/2 oz or more every 3 hours  Can continue to spend 10-15 minutes at the breast a couple times per day--if mom wants to keep baby familiar with the breast. Do skin to skin before pumping and before breastfeeding attempts. It may be helpful to feed baby at least part of her feeding before putting at the breast.     Gave exercises to do a few times per day, if they make appt with pediatric  dentist. Peds dentist would like these exercises done for a week before their appt.    FU with pediatrician next week with scheduled appt.  FU with lactation as needed.

## 2022-06-27 ENCOUNTER — APPOINTMENT (OUTPATIENT)
Dept: LACTATION | Facility: HOSPITAL | Age: 32
End: 2022-06-27

## 2022-07-14 ENCOUNTER — POSTPARTUM VISIT (OUTPATIENT)
Dept: OBSTETRICS AND GYNECOLOGY | Facility: CLINIC | Age: 32
End: 2022-07-14

## 2022-07-14 VITALS
WEIGHT: 228 LBS | DIASTOLIC BLOOD PRESSURE: 72 MMHG | HEIGHT: 68 IN | BODY MASS INDEX: 34.56 KG/M2 | SYSTOLIC BLOOD PRESSURE: 108 MMHG

## 2022-07-14 DIAGNOSIS — Z01.419 PAP TEST, AS PART OF ROUTINE GYNECOLOGICAL EXAMINATION: Primary | ICD-10-CM

## 2022-07-14 DIAGNOSIS — N83.209 CYST OF OVARY, UNSPECIFIED LATERALITY: ICD-10-CM

## 2022-07-14 PROCEDURE — 0503F POSTPARTUM CARE VISIT: CPT | Performed by: OBSTETRICS & GYNECOLOGY

## 2022-07-14 RX ORDER — ACETAMINOPHEN AND CODEINE PHOSPHATE 120; 12 MG/5ML; MG/5ML
1 SOLUTION ORAL DAILY
Qty: 84 TABLET | Refills: 3 | Status: SHIPPED | OUTPATIENT
Start: 2022-07-14 | End: 2022-11-17

## 2022-07-14 RX ORDER — DEXTROAMPHETAMINE SACCHARATE, AMPHETAMINE ASPARTATE, DEXTROAMPHETAMINE SULFATE AND AMPHETAMINE SULFATE 2.5; 2.5; 2.5; 2.5 MG/1; MG/1; MG/1; MG/1
TABLET ORAL
COMMUNITY

## 2022-07-14 NOTE — PROGRESS NOTES
"        Chief Complaint   Patient presents with   • Postpartum Care     6 week       6 Week Postpartum Visit         Keyonna Truong is a 31 y.o.  who presents today for a 6 week postpartum check.     Vaginal, Spontaneous    Information for the patient's :  Quiana Truong [[id number]]   2023   female   Quiana Truong   3615 g (7 lb 15.5 oz)   Gestational Age: 39w2d      Baby Discharged: Discharged with Mom  Delivering Physician: Radha Capps MD    At the time of delivery were you diagnosed with any of the following: None. The laceration was 2nd degree and is healing well. Patient describes vaginal bleeding as absent.  Patient is breast and bottle feeding. She desires contraceptive methods: oral BC for contraception. Patient denies bowel or bladder issues.      Patient denies postpartum depression. Postpartum Depression Screening Questionnaire: 2, no treatment indicated. Patient was on Lexapro prior to pregnancy and would like to go back on something.       Last Completed Pap Smear     This patient has no relevant Health Maintenance data.        Is the patient due for a pap today? Yes.  Performed Today    The additional following portions of the patient's history were reviewed and updated as appropriate: allergies and current medications.    Review of Systems   Constitutional: Negative.    HENT: Negative.    Eyes: Negative.    Respiratory: Negative.    Cardiovascular: Negative.    Gastrointestinal: Negative.    Endocrine: Negative.    Genitourinary: Negative.    Musculoskeletal: Negative.    Skin: Negative.    Allergic/Immunologic: Negative.    Neurological: Negative.    Hematological: Negative.    Psychiatric/Behavioral: Negative.      All other systems reviewed and are negative.     I have reviewed and agree with the HPI, ROS, and historical information as entered above. Radha Capps MD    /72   Ht 171.5 cm (67.5\")   Wt 103 kg (228 lb)   LMP 2021   BMI 35.18 " kg/m²     Physical Exam  Vitals and nursing note reviewed. Exam conducted with a chaperone present.   Constitutional:       Appearance: She is well-developed.   HENT:      Head: Normocephalic and atraumatic.   Neck:      Thyroid: No thyroid mass or thyromegaly.   Pulmonary:      Breath sounds: No rhonchi.   Abdominal:      Palpations: Abdomen is soft. Abdomen is not rigid. There is no mass.      Tenderness: There is no abdominal tenderness. There is no guarding.      Hernia: No hernia is present.   Genitourinary:     Vagina: Normal.      Cervix: Normal.      Uterus: Normal.    Musculoskeletal:      Cervical back: Normal range of motion. No muscular tenderness.   Neurological:      Mental Status: She is alert and oriented to person, place, and time.   Psychiatric:         Behavior: Behavior normal.             Assessment and Plan    Problem List Items Addressed This Visit    None     Visit Diagnoses     Pap test, as part of routine gynecological examination    -  Primary    Relevant Orders    LIQUID-BASED PAP SMEAR, [facility] ([facility])    Postpartum exam        Cyst of ovary, unspecified laterality        Relevant Orders    US Non-ob Transvaginal          1. S/p Vaginal delivery, 6 weeks postpartum.  Doing well.    2. Return to normal physical activity.  No pelvic restrictions.   3. LOBO - request to go on SSRI.  Counseled and will start Zoloft.  4. Baby doing well.  5. Breastfeeding going well.  6. Contraception: contraceptive methods: Oral progesterone-only contraceptive   7. 6 cm ovarian cyst - repeat u/s in 4 weeks  8. Return in about 4 weeks (around 8/11/2022) for US with Next Visit.     Radha Capps MD  07/14/2022

## 2022-07-20 LAB — REF LAB TEST METHOD: NORMAL

## 2022-08-29 ENCOUNTER — TELEPHONE (OUTPATIENT)
Dept: OBSTETRICS AND GYNECOLOGY | Facility: CLINIC | Age: 32
End: 2022-08-29

## 2022-08-29 NOTE — TELEPHONE ENCOUNTER
Pt returned phone call, I scheduled her to see Jefry 9/22. She states that she has been having sever abdominal pain since last Tuesday and wants to know if she needs to be seen before than here or if she needs to go see her PCP.    Please advise

## 2022-08-29 NOTE — TELEPHONE ENCOUNTER
lvom for pt to CB    If patient calls back: if patient requests appt with Dr. Capps, okay to go ahead and schedule.

## 2022-08-29 NOTE — TELEPHONE ENCOUNTER
I delivered my baby 12 weeks ago. I started having upper abdominal pain on Tuesday 8/23 and it has not gotten better. I am unsure if it is due to the ovarian cyst I plan to have removed but would like to have Dr. Capps evaluate. I will need an ultrasound to make plans for removal of the cyst. If this pain is not related to the cyst I am curious if it is related to Zoloft so I stopped taking it yesterday in case. Thank you

## 2022-08-30 ENCOUNTER — OFFICE VISIT (OUTPATIENT)
Dept: OBSTETRICS AND GYNECOLOGY | Facility: CLINIC | Age: 32
End: 2022-08-30

## 2022-08-30 VITALS
WEIGHT: 215.6 LBS | DIASTOLIC BLOOD PRESSURE: 78 MMHG | BODY MASS INDEX: 32.67 KG/M2 | SYSTOLIC BLOOD PRESSURE: 122 MMHG | HEIGHT: 68 IN

## 2022-08-30 DIAGNOSIS — N83.209 CYST OF OVARY, UNSPECIFIED LATERALITY: Primary | ICD-10-CM

## 2022-08-30 DIAGNOSIS — R10.11 RUQ PAIN: ICD-10-CM

## 2022-08-30 DIAGNOSIS — R10.13 EPIGASTRIC PAIN: ICD-10-CM

## 2022-08-30 PROCEDURE — 76830 TRANSVAGINAL US NON-OB: CPT | Performed by: OBSTETRICS & GYNECOLOGY

## 2022-08-30 PROCEDURE — 99213 OFFICE O/P EST LOW 20 MIN: CPT | Performed by: OBSTETRICS & GYNECOLOGY

## 2022-08-30 NOTE — PROGRESS NOTES
Abdominal Pain and Follow-up        HPI  Keyonna Truong is a 32 y.o. female, , who presents for initial evaluation evaluation of midepigastric pain.      The pain is located in the midepigastric region and it does occasionally radiate to RUQ. She has experienced this problem for 1 week. Patient states that the pain started after she had drank wine. Patient states that she stopped drinking wine after the pain had started, but the pain has still continued. Patient states that at her last OV she was started on Micronor and Zoloft. Patient states that she took Micronor for 5 days due to a decrease in milk supply. Patient states that she stopped Zoloft 2 days ago as she was unsure if this was causing her midepigastric pain. Patient states that she also started taking Pepcid complete and this has helped the pain. Patient states that she is unsure if stopped Zoloft or starting Pepcid has helped resolve her pain. She describes the pain as burning initially and then soreness and cramping and it occurs after she eats. She rates her pain score as a 0-5/10 when at its worst. Patient notes aggravating factors include eating and alleviating factors are stopping Zoloft and starting Pepcid. The patient reports additional symptoms as none.  The patient has not previously been evaluated for midepigastric/pelvic pain. The patient is sexually active. She has not had new partners.    Did the patient have u/s today? Yes.  Findings showed persistent complex left ovarian cyst.  I have personally evaluated the U/S and agree with the findings. Radha Capps MD    Her last LMP was Patient's last menstrual period was 2022.  Periods are irregular, lasting 6 days.  Dysmenorrhea:mild-moderate, occurring throughout menses.      Problems with GI: no  History of urinary disease: no  Concern for Anxiety or Depression: Yes- Anxiety. Patient states that she would like to continue taking Zoloft if it is not the cause of her  midepigastric pain.   Exercises Regularly: Yes  Tobacco Usage?: No     Additional OB/GYN History   Last Pap : 2022  Last Completed Pap Smear          PAP SMEAR (Every 3 Years) Next due on 2022  LIQUID-BASED PAP SMEAR, [facility] ([facility])                OB History        1    Para   1    Term   1       0    AB   0    Living   1       SAB   0    IAB   0    Ectopic   0    Molar   0    Multiple   0    Live Births   1          Obstetric Comments   FOB #1 : Pregnancy #1               Current Outpatient Medications:   •  amphetamine-dextroamphetamine (ADDERALL) 10 MG tablet, dextroamphetamine-amphetamine 10 mg tablet  Take 1 tablet every day by oral route as needed for 30 days., Disp: , Rfl:   •  Prenat w/o E-ND-Jzkivqh-FA-DHA (PNV-DHA PO), Take  by mouth., Disp: , Rfl:   •  norethindrone (MICRONOR) 0.35 MG tablet, Take 1 tablet by mouth Daily., Disp: 84 tablet, Rfl: 3  •  sertraline (Zoloft) 50 MG tablet, Take 1 tablet by mouth Daily., Disp: 30 tablet, Rfl: 2     Past Medical History:   Diagnosis Date   • Abnormal Pap smear of cervix      Colpo not needed but was done   • ADHD (attention deficit hyperactivity disorder)     took Adderall from age 14   • Anxiety    • Dysmenorrhea     hx see used to see Dr. Felicita Giang and Ivette HOYOS   • History of 2019 novel coronavirus disease (COVID-19)     dx 2022   • HPV (human papilloma virus) infection        • Pelvic pain     hx        Past Surgical History:   Procedure Laterality Date   • TONSILLECTOMY     • WISDOM TOOTH EXTRACTION         The additional following portions of the patient's history were reviewed and updated as appropriate: allergies and current medications.      Review of Systems   Constitutional: Negative.    HENT: Negative.    Eyes: Negative.    Respiratory: Negative.    Cardiovascular: Negative.    Gastrointestinal: Positive for abdominal distention.   Endocrine: Negative.    Genitourinary: Negative.   "  Musculoskeletal: Negative.    Skin: Negative.    Allergic/Immunologic: Negative.    Neurological: Negative.    Hematological: Negative.    Psychiatric/Behavioral: Negative.      All other systems reviewed and are negative.     I have reviewed and agree with the HPI, ROS, and historical information as entered above. Radha Capps MD    Objective   /78   Ht 171.5 cm (67.5\")   Wt 97.8 kg (215 lb 9.6 oz)   LMP 07/19/2022   BMI 33.27 kg/m²     Physical Exam  Constitutional:       Appearance: She is well-developed.   HENT:      Head: Normocephalic.   Eyes:      Conjunctiva/sclera: Conjunctivae normal.   Pulmonary:      Effort: Pulmonary effort is normal.   Psychiatric:         Behavior: Behavior normal.         Assessment & Plan     Assessment and Plan    Problem List Items Addressed This Visit    None     Visit Diagnoses     Cyst of ovary, unspecified laterality    -  Primary    Epigastric pain        Relevant Orders    US Gallbladder    RUQ pain        Relevant Orders    US Gallbladder          1. 7 cm persistent complex cyst on left ovary.  Will plan removal before end of year.  U/S appearance c/w probable endometrioma.  2. New epigastric/RUQ pain-  Proceed with u/s of gallbladder to be sure we do not need to address this on upcoming surgery of ovary      Radha Capps MD  08/30/2022    "

## 2022-09-16 ENCOUNTER — HOSPITAL ENCOUNTER (OUTPATIENT)
Dept: ULTRASOUND IMAGING | Facility: HOSPITAL | Age: 32
Discharge: HOME | End: 2022-09-16
Admitting: OBSTETRICS & GYNECOLOGY

## 2022-09-16 DIAGNOSIS — R10.13 EPIGASTRIC PAIN: ICD-10-CM

## 2022-09-16 DIAGNOSIS — R10.11 RUQ PAIN: ICD-10-CM

## 2022-09-16 PROCEDURE — 76705 ECHO EXAM OF ABDOMEN: CPT

## 2022-09-21 ENCOUNTER — TELEPHONE (OUTPATIENT)
Dept: OBSTETRICS AND GYNECOLOGY | Facility: CLINIC | Age: 32
End: 2022-09-21

## 2022-10-05 NOTE — TELEPHONE ENCOUNTER
Patient needs to schedule an appointment. Patient was supposed to return to clinic around 08/11/2022.  Sending in 1 month supply.

## 2022-10-20 ENCOUNTER — TELEPHONE (OUTPATIENT)
Dept: OBSTETRICS AND GYNECOLOGY | Facility: CLINIC | Age: 32
End: 2022-10-20

## 2022-10-20 NOTE — TELEPHONE ENCOUNTER
Patient scheduled for left ovarian cystectomy in November and would like to discuss recovery time to give her job a heads up

## 2022-11-02 NOTE — TELEPHONE ENCOUNTER
Last OV 08/30/22. Patient started on Zoloft at PP visit on 07/14/22.  Next appt scheduled 11/17/22  Rx refilled until next appt.

## 2022-11-17 ENCOUNTER — OFFICE VISIT (OUTPATIENT)
Dept: OBSTETRICS AND GYNECOLOGY | Facility: CLINIC | Age: 32
End: 2022-11-17

## 2022-11-17 VITALS
SYSTOLIC BLOOD PRESSURE: 130 MMHG | DIASTOLIC BLOOD PRESSURE: 80 MMHG | HEIGHT: 68 IN | BODY MASS INDEX: 33.65 KG/M2 | WEIGHT: 222 LBS

## 2022-11-17 DIAGNOSIS — N83.201 CYST OF RIGHT OVARY: ICD-10-CM

## 2022-11-17 DIAGNOSIS — Z01.818 PRE-OP EXAM: Primary | ICD-10-CM

## 2022-11-17 LAB
ERYTHROCYTE [DISTWIDTH] IN BLOOD BY AUTOMATED COUNT: 14.1 % (ref 12.3–15.4)
HCG INTACT+B SERPL-ACNC: <1 MIU/ML
HCT VFR BLD AUTO: 40.5 % (ref 34–46.6)
HGB BLD-MCNC: 13.9 G/DL (ref 12–15.9)
MCH RBC QN AUTO: 29.4 PG (ref 26.6–33)
MCHC RBC AUTO-ENTMCNC: 34.3 G/DL (ref 31.5–35.7)
MCV RBC AUTO: 85.6 FL (ref 79–97)
PLATELET # BLD AUTO: 258 10*3/MM3 (ref 140–450)
RBC # BLD AUTO: 4.73 10*6/MM3 (ref 3.77–5.28)
WBC # BLD AUTO: 6.87 10*3/MM3 (ref 3.4–10.8)

## 2022-11-17 PROCEDURE — 99213 OFFICE O/P EST LOW 20 MIN: CPT | Performed by: OBSTETRICS & GYNECOLOGY

## 2022-11-17 RX ORDER — HYDROCODONE BITARTRATE AND ACETAMINOPHEN 5; 325 MG/1; MG/1
1 TABLET ORAL EVERY 4 HOURS PRN
Qty: 12 TABLET | Refills: 0 | Status: SHIPPED | OUTPATIENT
Start: 2022-11-17 | End: 2022-12-13

## 2022-11-17 NOTE — PROGRESS NOTES
Gynecologic Preoperative Exam Note        Subjective   Keyonna Truong is a 32 y.o. year old  who is scheduled for right ovarian cystectomy at [facility] on 2022 at 1:45PM. Her pre operative diagnosis is Ovarian Cyst. She does not need to see her PCP for preop clearance for this surgery. Patient's last menstrual period was 08/15/2022 (within days). Her birth control method is none. Her BMI is Body mass index is 34.26 kg/m².     She has reviewed the informational pamphlet on 2022.    She understands the risks of bleeding, infection, possible damage to other organ systems, including but not limited to the gastrointestinal tract and genitourinary tract.  She also understands the specific risks listed in the preop information (video, pamphlets, etc.).    She has reviewed and signed the preop consent form.    She has been instructed to have a light dinner the night before surgery, then nothing to eat or drink after midnight.  The day of surgery do not chew gum or smoke.  Remove all jewelry, nail polish, contact lenses prior to coming to the hospital.  Do not bring valuables or large sums of money with you. Patient was instructed on what time to arrive and where to check in, maps were given.  She was instructed that she will meet an Anesthesiologist and that an IV will be started to provide fluids and sedation.  The total time of procedure was discussed.  She was instructed that she will need a .      No Known Allergies  She has confirmed that she is not allergic to Latex.     She is on the following medications. These were reviewed with the patient today and instructed on which medications are ok to take with a sip of water prior to the surgery.      Current Outpatient Medications:   •  amphetamine-dextroamphetamine (ADDERALL) 10 MG tablet, dextroamphetamine-amphetamine 10 mg tablet  Take 1 tablet every day by oral route as needed for 30 days., Disp: , Rfl:   •  Charly w/o W-GO-Azwothz-FA-DHA  (PNV-DHA PO), Take  by mouth., Disp: , Rfl:   •  sertraline (ZOLOFT) 50 MG tablet, Take 1 tablet by mouth Daily., Disp: 30 tablet, Rfl: 0     Past Medical History:   Diagnosis Date   • Abnormal Pap smear of cervix      Colpo not needed but was done   • ADHD (attention deficit hyperactivity disorder)     took Adderall from age 14   • Anxiety    • Dysmenorrhea     hx see used to see Dr. Felicita Giang and Ivette Reno APRN   • History of 2019 novel coronavirus disease (COVID-19)     dx 2022   • HPV (human papilloma virus) infection        • Pelvic pain     hx   • Varicella 18 months old     Past Surgical History:   Procedure Laterality Date   • TONSILLECTOMY     • WISDOM TOOTH EXTRACTION       OB History    Para Term  AB Living   1 1 1 0 0 1   SAB IAB Ectopic Molar Multiple Live Births   0 0 0 0 0 1      # Outcome Date GA Lbr Tyree/2nd Weight Sex Delivery Anes PTL Lv   1 Term 21 39w2d  3615 g (7 lb 15.5 oz) F Vag-Spont EPI N KAROL      Name: ARCELIA CONTE      Apgar1: 8  Apgar5: 9      Obstetric Comments   FOB #1 : Pregnancy #1     Social History     Tobacco Use   Smoking Status Former   • Packs/day: 0.50   • Years: 15.00   • Pack years: 7.50   • Types: Cigarettes   • Start date: 2006   • Quit date: 2021   • Years since quittin.1   Smokeless Tobacco Never     Social History     Substance and Sexual Activity   Alcohol Use Yes   • Alcohol/week: 6.0 standard drinks   • Types: 6 Glasses of wine per week    Comment: 10 per week     Social History     Substance and Sexual Activity   Drug Use Never         Review of Systems   Constitutional: Negative.    Respiratory: Negative.    Cardiovascular: Negative.    Endocrine: Negative.    Genitourinary: Negative.    Allergic/Immunologic: Negative.    Neurological: Negative.            Objective    Vitals:    22 1136   BP: 130/80         Physical Exam  Vitals and nursing note reviewed. Exam conducted with a chaperone present.    Constitutional:       Appearance: She is well-developed.   HENT:      Head: Normocephalic and atraumatic.   Eyes:      Conjunctiva/sclera: Conjunctivae normal.   Cardiovascular:      Rate and Rhythm: Normal rate and regular rhythm.   Pulmonary:      Effort: Pulmonary effort is normal.      Breath sounds: Normal breath sounds.   Abdominal:      General: Bowel sounds are normal.      Palpations: Abdomen is soft. Abdomen is not rigid.   Musculoskeletal:      Cervical back: Normal range of motion. No muscular tenderness.   Skin:     General: Skin is warm and dry.   Neurological:      Mental Status: She is alert and oriented to person, place, and time.   Psychiatric:         Behavior: Behavior normal.         Assessment   Problem List Items Addressed This Visit    None  Visit Diagnoses     Pre-op exam    -  Primary    Relevant Orders    HCG, B-subunit, Quantitative    CBC (No Diff)    Cyst of right ovary                       Plan     1. Risks of surgery were reviewed with the patient including risks of bleeding, infection, damage to other organ systems including, but not limited to GI and  tracts (bowel, bladder, blood vessels, nerves) risks of Anesthesia, as well as the risk the surgery will not produce the desired results, possible need for additional surgery, death, risk of uterine perforation.  2. PAT Scheduled    3. Melvin has been obtained and reviewed   4. Pain Medication Consent Form has been signed.  A review regarding proper medication administration, impact on driving and working while medicated, the safety of use in pregnancy, the potential for overdose and the proper disposal and storage of controlled medications has been done with the patient.          Radha Capps MD  11/17/2022

## 2022-11-28 ENCOUNTER — LAB REQUISITION (OUTPATIENT)
Dept: LAB | Facility: HOSPITAL | Age: 32
End: 2022-11-28
Payer: COMMERCIAL

## 2022-11-28 ENCOUNTER — OUTSIDE FACILITY SERVICE (OUTPATIENT)
Dept: OBSTETRICS AND GYNECOLOGY | Facility: CLINIC | Age: 32
End: 2022-11-28

## 2022-11-28 DIAGNOSIS — N83.209 UNSPECIFIED OVARIAN CYST, UNSPECIFIED SIDE: ICD-10-CM

## 2022-11-28 PROCEDURE — 88305 TISSUE EXAM BY PATHOLOGIST: CPT

## 2022-11-28 PROCEDURE — 58662 LAPAROSCOPY EXCISE LESIONS: CPT | Performed by: OBSTETRICS & GYNECOLOGY

## 2022-11-28 PROCEDURE — 88311 DECALCIFY TISSUE: CPT

## 2022-11-29 DIAGNOSIS — G89.18 POSTOPERATIVE PAIN: Primary | ICD-10-CM

## 2022-11-29 DIAGNOSIS — Z01.818 PRE-OP EXAM: ICD-10-CM

## 2022-11-29 DIAGNOSIS — N83.201 CYST OF RIGHT OVARY: ICD-10-CM

## 2022-11-29 RX ORDER — HYDROCODONE BITARTRATE AND ACETAMINOPHEN 5; 325 MG/1; MG/1
1 TABLET ORAL EVERY 4 HOURS PRN
Qty: 12 TABLET | Refills: 0 | OUTPATIENT
Start: 2022-11-29

## 2022-11-29 RX ORDER — HYDROCODONE BITARTRATE AND ACETAMINOPHEN 5; 325 MG/1; MG/1
1 TABLET ORAL EVERY 4 HOURS PRN
Qty: 10 TABLET | Refills: 0 | Status: SHIPPED | OUTPATIENT
Start: 2022-11-29 | End: 2022-12-13

## 2022-11-29 NOTE — TELEPHONE ENCOUNTER
Pt calling back to see if she can have 1 day of pain meds extra. She was in significant pain last night and thinks she may not have enough meds.

## 2022-11-30 LAB — REF LAB TEST METHOD: NORMAL

## 2022-12-13 ENCOUNTER — OFFICE VISIT (OUTPATIENT)
Dept: OBSTETRICS AND GYNECOLOGY | Facility: CLINIC | Age: 32
End: 2022-12-13

## 2022-12-13 VITALS
DIASTOLIC BLOOD PRESSURE: 70 MMHG | HEIGHT: 68 IN | WEIGHT: 219.8 LBS | BODY MASS INDEX: 33.31 KG/M2 | SYSTOLIC BLOOD PRESSURE: 110 MMHG

## 2022-12-13 DIAGNOSIS — Z48.89 POSTOPERATIVE VISIT: Primary | ICD-10-CM

## 2022-12-13 DIAGNOSIS — Z98.890 HISTORY OF DERMOID CYST EXCISION: ICD-10-CM

## 2022-12-13 DIAGNOSIS — Z86.018 HISTORY OF DERMOID CYST EXCISION: ICD-10-CM

## 2022-12-13 PROCEDURE — 99024 POSTOP FOLLOW-UP VISIT: CPT | Performed by: OBSTETRICS & GYNECOLOGY

## 2022-12-13 NOTE — PROGRESS NOTES
"     OBGYN Postoperative Exam Note          Subjective   Chief Complaint   Patient presents with   • Post-op     S/p      Keyonna Truong is a 32 y.o. year old  presenting to be seen for her post-operative visit. She is S/P diagnostic laparoscopy with (R) ovarian cystectomy on 2022 at [facility] for 7cm complex cyst. She c/o period like cramping when she has a BM.  She denies constipation, fever, and urinary s/s.      Path:  C/w mature cystic teratoma     OTHER THINGS SHE WANTS TO DISCUSS TODAY:  She is in the process of weaning and wants to go back on OCP's when she is finished breastfeeding.  She has not had a period since delivery.       Current Outpatient Medications:   •  amphetamine-dextroamphetamine (ADDERALL) 10 MG tablet, dextroamphetamine-amphetamine 10 mg tablet  Take 1 tablet every day by oral route as needed for 30 days., Disp: , Rfl:   •  Prenat w/o X-IF-Hcrnnao-FA-DHA (PNV-DHA PO), Take  by mouth., Disp: , Rfl:   •  sertraline (ZOLOFT) 50 MG tablet, TAKE 1 TABLET BY MOUTH DAILY, Disp: 30 tablet, Rfl: 0     Past Medical History:   Diagnosis Date   • Abnormal Pap smear of cervix      Colpo not needed but was done   • ADHD (attention deficit hyperactivity disorder)     took Adderall from age 14   • Anxiety    • Dysmenorrhea     hx see used to see Dr. Felicita Giang and Ivette HOYOS   • History of 2019 novel coronavirus disease (COVID-19)     dx 2022   • HPV (human papilloma virus) infection        • Pelvic pain     hx   • Varicella 18 months old        Past Surgical History:   Procedure Laterality Date   • PELVIC LAPAROSCOPY  22    Cyst removal by Dr. Yanci Capps   • TONSILLECTOMY     • WISDOM TOOTH EXTRACTION         The following portions of the patient's history were reviewed and updated as appropriate:current medications and allergies    Review of Systems       Objective   /70   Ht 171.5 cm (67.5\")   Wt 99.7 kg (219 lb 12.8 oz)   LMP  (LMP Unknown)   Breastfeeding " Yes   BMI 33.92 kg/m²     Physical Exam  Vitals and nursing note reviewed.   Constitutional:       Appearance: She is well-developed.   HENT:      Head: Normocephalic and atraumatic.   Pulmonary:      Effort: Pulmonary effort is normal.   Abdominal:      General: A surgical scar is present.      Palpations: Abdomen is soft. Abdomen is not rigid.      Comments: Clean, Dry, and Intact.  No erythema.    Musculoskeletal:      Cervical back: Normal range of motion.   Neurological:      Mental Status: She is alert and oriented to person, place, and time.   Psychiatric:         Mood and Affect: Mood normal.         Behavior: Behavior normal.              Assessment   1. S/P dx laparoscopy with (R) ovarian cystectomy      Plan   1. May return to full activity with no restrictions  2. The importance of keeping all planned follow-up and taking all medications as prescribed was emphasized.  3. Discussed final path of mature teratoma and will f/u with u/s in 6 months.              Radha Capps MD  12/13/2022

## 2023-01-26 ENCOUNTER — TELEPHONE (OUTPATIENT)
Dept: OBSTETRICS AND GYNECOLOGY | Facility: CLINIC | Age: 33
End: 2023-01-26
Payer: COMMERCIAL

## 2023-08-11 ENCOUNTER — OFFICE VISIT (OUTPATIENT)
Dept: OBSTETRICS AND GYNECOLOGY | Facility: CLINIC | Age: 33
End: 2023-08-11
Payer: COMMERCIAL

## 2023-08-11 VITALS
BODY MASS INDEX: 33.13 KG/M2 | HEIGHT: 68 IN | WEIGHT: 218.6 LBS | DIASTOLIC BLOOD PRESSURE: 82 MMHG | SYSTOLIC BLOOD PRESSURE: 130 MMHG

## 2023-08-11 DIAGNOSIS — Z01.419 ROUTINE GYNECOLOGICAL EXAMINATION: ICD-10-CM

## 2023-08-11 DIAGNOSIS — Z86.018 HISTORY OF DERMOID CYST EXCISION: Primary | ICD-10-CM

## 2023-08-11 DIAGNOSIS — N83.201 CYST OF RIGHT OVARY: ICD-10-CM

## 2023-08-11 DIAGNOSIS — Z98.890 HISTORY OF DERMOID CYST EXCISION: Primary | ICD-10-CM

## 2023-08-11 PROBLEM — F41.8 MIXED ANXIETY AND DEPRESSIVE DISORDER: Status: ACTIVE | Noted: 2022-07-01

## 2023-08-11 RX ORDER — DEXTROAMPHETAMINE SACCHARATE, AMPHETAMINE ASPARTATE, DEXTROAMPHETAMINE SULFATE, AND AMPHETAMINE SULFATE 3.75; 3.75; 3.75; 3.75 MG/1; MG/1; MG/1; MG/1
15 TABLET ORAL 2 TIMES DAILY
COMMUNITY
Start: 2023-03-29

## 2023-08-11 RX ORDER — ESCITALOPRAM OXALATE 10 MG/1
TABLET, FILM COATED ORAL
COMMUNITY
Start: 2023-03-29

## 2023-08-11 RX ORDER — NORETHINDRONE ACETATE AND ETHINYL ESTRADIOL, ETHINYL ESTRADIOL AND FERROUS FUMARATE 1MG-10(24)
KIT ORAL
COMMUNITY
Start: 2023-03-29 | End: 2023-08-11 | Stop reason: SDUPTHER

## 2023-08-11 RX ORDER — NORETHINDRONE ACETATE AND ETHINYL ESTRADIOL, ETHINYL ESTRADIOL AND FERROUS FUMARATE 1MG-10(24)
1 KIT ORAL DAILY
Qty: 84 TABLET | Refills: 4 | Status: SHIPPED | OUTPATIENT
Start: 2023-08-11

## 2023-08-11 NOTE — PROGRESS NOTES
Gynecologic Annual Exam Note        Gynecologic Exam        Subjective     HPI  Keyonna Truong is a 33 y.o.  female who presents for annual well woman exam as a established patient. There were no changes to her medical or surgical history since her last visit.. Patient reports problems with: none. Patient's last menstrual period was 2023.. Her periods occur every 25-35 days , lasting 4 days. The flow is moderate.. She reports dysmenorrhea is mild, occurring first 1-2 days of flow. Partner Status: Marital Status: .  She is sexually active. She has not had new partners.. STD testing recommendations have been explained to the patient and she does not desire STD testing.    Patient also had an ultrasound to f/u after having a 7cm right dermoid cyst removed last November. Sono shows a 1.8cm complex cyst on the right ovary.    Additional OB/GYN History   Current contraception: contraceptive methods: OCP (estrogen/progesterone)  Desires to: continue contraception  Thromboembolic Disease: none  Age of menarche: 12    History of STD: no    Last Pap : 2022. Results: negative. HPV: negative.   Last Completed Pap Smear            PAP SMEAR (Every 3 Years) Next due on 2022  LIQUID-BASED PAP SMEAR, [facility] ([facility])                     History of abnormal Pap smear: no  Gardasil status:completed  Family history of uterine, colon, breast, or ovarian cancer: yes - MGM had breast cancer  Performs monthly Self-Breast Exam: yes  Exercises Regularly:yes  Feelings of Anxiety or Depression: yes - Treating with Lexapro  Tobacco Usage?: No , Does Vape on occasion      Current Outpatient Medications:     Lexapro 10 MG tablet, , Disp: , Rfl:     Lo Loestrin Fe 1 MG-10 MCG / 10 MCG tablet, Take 1 tablet by mouth Daily., Disp: 84 tablet, Rfl: 4    amphetamine-dextroamphetamine (ADDERALL) 10 MG tablet, dextroamphetamine-amphetamine 10 mg tablet  Take 1 tablet every day by oral route  as needed for 30 days., Disp: , Rfl:     Prenat w/o C-HE-Qgompsv-FA-DHA (PNV-DHA PO), Take  by mouth., Disp: , Rfl:      Patient denies the need for medication refills today.    OB History          1    Para   1    Term   1       0    AB   0    Living   1         SAB   0    IAB   0    Ectopic   0    Molar   0    Multiple   0    Live Births   1          Obstetric Comments   FOB #1 : Pregnancy #1               Health Maintenance   Topic Date Due    Annual Gynecologic Pelvic and Breast Exam  Never done    Pneumococcal Vaccine 0-64 (1 - PCV) Never done    ANNUAL PHYSICAL  Never done    INFLUENZA VACCINE  10/01/2023    PAP SMEAR  2025    TDAP/TD VACCINES (4 - Td or Tdap) 2032    HEPATITIS C SCREENING  Completed    COVID-19 Vaccine  Completed       Past Medical History:   Diagnosis Date    Abnormal Pap smear of cervix      Colpo not needed but was done    ADHD (attention deficit hyperactivity disorder)     took Adderall from age 14    Anxiety     Dysmenorrhea     hx see used to see Dr. Felicita Giang and Ivette Reno APRN    History of  novel coronavirus disease (COVID-19)     dx 2022    HPV (human papilloma virus) infection     2007    Ovarian cyst 2021    Pelvic pain     hx    Varicella 18 months old        Past Surgical History:   Procedure Laterality Date    OVARIAN CYST SURGERY  22    PELVIC LAPAROSCOPY  22    Cyst removal by Dr. Yanci Capps    TONSILLECTOMY      WISDOM TOOTH EXTRACTION         The additional following portions of the patient's history were reviewed and updated as appropriate: allergies, current medications, past family history, past medical history, past social history, past surgical history, and problem list.    Review of Systems   Constitutional: Negative.    Respiratory: Negative.     Cardiovascular: Negative.    Gastrointestinal: Negative.    Genitourinary: Negative.    Psychiatric/Behavioral: Negative.     All other systems reviewed and are  "negative.      I have reviewed and agree with the HPI, ROS, and historical information as entered above. Martha Hernandez, APRN          Objective   /82   Ht 171.5 cm (67.52\")   Wt 99.2 kg (218 lb 9.6 oz)   LMP 07/17/2023   Breastfeeding No   BMI 33.71 kg/mý     Physical Exam  Vitals and nursing note reviewed. Exam conducted with a chaperone present.   Constitutional:       General: She is not in acute distress.     Appearance: Normal appearance. She is well-developed. She is not ill-appearing.   Neck:      Thyroid: No thyroid mass or thyromegaly.   Pulmonary:      Effort: Pulmonary effort is normal. No respiratory distress or retractions.   Chest:      Chest wall: No mass.   Breasts:     Right: Normal. No mass, nipple discharge, skin change or tenderness.      Left: Normal. No mass, nipple discharge, skin change or tenderness.   Abdominal:      General: There is no distension.      Palpations: Abdomen is soft. Abdomen is not rigid. There is no mass.      Tenderness: There is no abdominal tenderness. There is no guarding or rebound.      Hernia: No hernia is present.   Genitourinary:     General: Normal vulva.      Exam position: Lithotomy position.      Labia:         Right: No rash, tenderness or lesion.         Left: No rash, tenderness or lesion.       Vagina: Normal. No vaginal discharge or lesions.      Cervix: Normal.      Uterus: Normal. Not enlarged, not fixed and not tender.       Adnexa: Right adnexa normal and left adnexa normal.        Right: No mass or tenderness.          Left: No mass or tenderness.        Rectum: Normal. No external hemorrhoid.   Musculoskeletal:      Cervical back: No muscular tenderness.   Skin:     General: Skin is warm and dry.   Neurological:      Mental Status: She is alert and oriented to person, place, and time.   Psychiatric:         Mood and Affect: Mood normal.         Behavior: Behavior normal.          Assessment and Plan    Problem List Items Addressed This Visit "          Hematology and Neoplasia    History of dermoid cyst excision - Primary    Overview     Laparoscopic removal of dermoid cyst 12/2022          Other Visit Diagnoses       Cyst of right ovary        Relevant Orders    US Non-ob Transvaginal    Routine gynecological examination                GYN annual well woman exam.   Reviewed pap guidelines. Not due until 2025.  OCP refilled.   Reviewed monthly self breast exams.  Instructed to call with lumps, pain, or breast discharge.    Return in about 3 months (around 11/11/2023) for ultrasound, Capps**.      Martha Hernandez, ROMAIN  08/11/2023

## 2023-11-09 ENCOUNTER — OFFICE VISIT (OUTPATIENT)
Dept: OBSTETRICS AND GYNECOLOGY | Facility: CLINIC | Age: 33
End: 2023-11-09
Payer: COMMERCIAL

## 2023-11-09 VITALS
HEIGHT: 68 IN | BODY MASS INDEX: 32.89 KG/M2 | DIASTOLIC BLOOD PRESSURE: 88 MMHG | WEIGHT: 217 LBS | SYSTOLIC BLOOD PRESSURE: 130 MMHG

## 2023-11-09 DIAGNOSIS — N83.201 CYST OF RIGHT OVARY: Primary | ICD-10-CM

## 2023-11-09 PROBLEM — N83.299 COMPLEX OVARIAN CYST: Status: RESOLVED | Noted: 2021-11-24 | Resolved: 2023-11-09

## 2023-11-09 NOTE — PROGRESS NOTES
Chief Complaint   Patient presents with    Follow-up     Ovarian cyst         Subjective   HPI  Keyonna Truong is a 33 y.o. female, , who presents for follow up evaluation of ovarian cyst.      Her last LMP was Patient's last menstrual period was 2023..  She was last seen on 23. At that time the plan was expectant management for management of the cyst. Since her last visit she denies pelvic pain. The patient reports additional symptoms as none.      Did the patient have u/s today? Yes, right ovarian cyst measuring 9.1 mm x 11.1 mm x 12.2 mm (complex cyst)    Additional OB/GYN History   Last Pap :   Last Completed Pap Smear            PAP SMEAR (Every 3 Years) Next due on 2022  LIQUID-BASED PAP SMEAR, [facility] ([facility])                  History of abnormal Pap smear: yes -  Colposcopy  Tobacco Usage?: Yes Keyonna Truong  reports that she has been smoking electronic cigarette. She has never used smokeless tobacco.. I have educated her on the risk of diseases from using tobacco products such as cancer, COPD, and heart disease.     I advised her to quit and she is not willing to quit.    I spent 3  minutes counseling the patient.           Current Outpatient Medications:     Adderall 15 MG tablet, Take 1 tablet by mouth 2 (Two) Times a Day., Disp: , Rfl:     Lexapro 10 MG tablet, , Disp: , Rfl:     Lo Loestrin Fe 1 MG-10 MCG / 10 MCG tablet, Take 1 tablet by mouth Daily., Disp: 84 tablet, Rfl: 4     Past Medical History:   Diagnosis Date    Abnormal Pap smear of cervix      Colpo not needed but was done    ADHD (attention deficit hyperactivity disorder)     took Adderall from age 14    Anxiety     Dysmenorrhea     hx see used to see Dr. Felicita Giang and Ivette Reno APRN    History of 2019 novel coronavirus disease (COVID-19)     dx 2022    HPV (human papilloma virus) infection     2007    Ovarian cyst 2021    Pelvic pain     hx    Varicella 18  "months old        Past Surgical History:   Procedure Laterality Date    OVARIAN CYST SURGERY  11/28/22    PELVIC LAPAROSCOPY  11/28/22    Cyst removal by Dr. Yanci Capps    TONSILLECTOMY      WISDOM TOOTH EXTRACTION         The additional following portions of the patient's history were reviewed and updated as appropriate: allergies and current medications.    Review of Systems   Constitutional: Negative.    HENT: Negative.     Eyes: Negative.    Respiratory: Negative.     Cardiovascular: Negative.    Gastrointestinal: Negative.    Endocrine: Negative.    Genitourinary: Negative.    Musculoskeletal: Negative.    Skin: Negative.    Allergic/Immunologic: Negative.    Neurological: Negative.    Hematological: Negative.    Psychiatric/Behavioral: Negative.       All other systems reviewed and are negative.     I have reviewed and agree with the HPI, ROS, and historical information as entered above. Radha Capps MD      /88   Ht 171.5 cm (67.5\")   Wt 98.4 kg (217 lb)   LMP 11/06/2023   BMI 33.49 kg/m²     Physical Exam  Constitutional:       Appearance: She is well-developed.   HENT:      Head: Normocephalic.   Eyes:      Conjunctiva/sclera: Conjunctivae normal.   Pulmonary:      Effort: Pulmonary effort is normal.   Psychiatric:         Behavior: Behavior normal.         Assessment & Plan     Assessment and Plan    Problem List Items Addressed This Visit    None  Visit Diagnoses       Cyst of right ovary    -  Primary            U/S reviewed and less than 1 cm small cyst in right.  No leiomyoma or worrisome findings.    F/U prn or at annual.      Radha Capps MD  11/09/2023  "

## 2024-06-18 RX ORDER — NORETHINDRONE ACETATE AND ETHINYL ESTRADIOL, ETHINYL ESTRADIOL AND FERROUS FUMARATE 1MG-10(24)
1 KIT ORAL DAILY
Qty: 84 TABLET | Refills: 0 | Status: SHIPPED | OUTPATIENT
Start: 2024-06-18

## 2024-08-20 ENCOUNTER — OFFICE VISIT (OUTPATIENT)
Dept: OBSTETRICS AND GYNECOLOGY | Facility: CLINIC | Age: 34
End: 2024-08-20
Payer: COMMERCIAL

## 2024-08-20 VITALS
HEIGHT: 68 IN | DIASTOLIC BLOOD PRESSURE: 82 MMHG | BODY MASS INDEX: 35.01 KG/M2 | WEIGHT: 231 LBS | SYSTOLIC BLOOD PRESSURE: 122 MMHG

## 2024-08-20 DIAGNOSIS — Z01.419 WOMEN'S ANNUAL ROUTINE GYNECOLOGICAL EXAMINATION: Primary | ICD-10-CM

## 2024-08-20 DIAGNOSIS — Z30.41 ENCOUNTER FOR SURVEILLANCE OF CONTRACEPTIVE PILLS: ICD-10-CM

## 2024-08-20 DIAGNOSIS — Z12.39 ENCOUNTER FOR BREAST CANCER SCREENING USING NON-MAMMOGRAM MODALITY: ICD-10-CM

## 2024-08-20 PROCEDURE — 99459 PELVIC EXAMINATION: CPT | Performed by: OBSTETRICS & GYNECOLOGY

## 2024-08-20 PROCEDURE — 99395 PREV VISIT EST AGE 18-39: CPT | Performed by: OBSTETRICS & GYNECOLOGY

## 2024-08-20 RX ORDER — NORETHINDRONE ACETATE AND ETHINYL ESTRADIOL, ETHINYL ESTRADIOL AND FERROUS FUMARATE 1MG-10(24)
1 KIT ORAL DAILY
Qty: 84 TABLET | Refills: 4 | Status: SHIPPED | OUTPATIENT
Start: 2024-08-20 | End: 2024-08-20 | Stop reason: SDUPTHER

## 2024-08-20 RX ORDER — NORETHINDRONE ACETATE AND ETHINYL ESTRADIOL, ETHINYL ESTRADIOL AND FERROUS FUMARATE 1MG-10(24)
1 KIT ORAL DAILY
Qty: 84 TABLET | Refills: 4 | Status: SHIPPED | OUTPATIENT
Start: 2024-08-20

## 2024-08-20 NOTE — PROGRESS NOTES
Gynecologic Annual Exam Note        Gynecologic Exam        Subjective     HPI  Keyonna Truong is a 34 y.o.  female who presents for annual well woman exam as a established patient. There were no changes to her medical or surgical history since her last visit. Patient's last menstrual period was 2024 (exact date). Her periods occur every month, lasting 3-5 days.  The flow is moderate. She reports dysmenorrhea is mild occurring first 1-2 days of flow. Marital Status: . She is sexually active. She has not had new partners. STD testing recommendations have been explained to the patient and she does not desire STD testing.    The patient would like to discuss the following complaints today: no complaints.    Additional OB/GYN History   contraceptive methods: OCP (estrogen/progesterone)  Desires to: continue contraception  Thromboembolic Disease: none  History of migraines: no  Age of menarche: 12    History of STD: no    Last Pap : 2022. Results: negative. HPV: negative.   Last Completed Pap Smear            PAP SMEAR (Every 3 Years) Next due on 2022  LIQUID-BASED PAP SMEAR, [facility] ([facility])                     History of abnormal Pap smear:  Yes  Gardasil status:completed  Family history of uterine, colon, breast, or ovarian cancer: yes - MGM- breast CA  Performs monthly Self-Breast Exam: yes  Exercises Regularly: Sometimes  Feelings of Anxiety or Depression: no  Tobacco Usage?: No       Current Outpatient Medications:     Adderall 15 MG tablet, Take 1 tablet by mouth 2 (Two) Times a Day., Disp: , Rfl:     Lexapro 10 MG tablet, , Disp: , Rfl:     Lo Loestrin Fe 1 MG-10 MCG / 10 MCG tablet, Take 1 tablet by mouth Daily., Disp: 84 tablet, Rfl: 4     Patient is requesting refills of JORGE.    OB History          1    Para   1    Term   1       0    AB   0    Living   1         SAB   0    IAB   0    Ectopic   0    Molar   0    Multiple   0    Live  Births   1          Obstetric Comments   FOB #1 : Pregnancy #1               Health Maintenance   Topic Date Due    BMI FOLLOWUP  Never done    Pneumococcal Vaccine 0-64 (1 of 2 - PCV) Never done    ANNUAL PHYSICAL  Never done    Annual Gynecologic Pelvic and Breast Exam  08/12/2024    INFLUENZA VACCINE  08/01/2024    PAP SMEAR  07/14/2025    TDAP/TD VACCINES (4 - Td or Tdap) 03/21/2032    HEPATITIS C SCREENING  Completed    COVID-19 Vaccine  Completed       Past Medical History:   Diagnosis Date    Abnormal Pap smear of cervix     2007 Colpo not needed but was done    ADHD (attention deficit hyperactivity disorder)     took Adderall from age 14    Anxiety     Dysmenorrhea     hx see used to see Dr. Felicita Giang and Ivette Reno APRN    History of 2019 novel coronavirus disease (COVID-19)     dx 4/14/2022    HPV (human papilloma virus) infection     2007    Ovarian cyst October 2021    Pelvic pain     hx    Varicella 18 months old        Past Surgical History:   Procedure Laterality Date    OVARIAN CYST SURGERY  11/28/22    PELVIC LAPAROSCOPY  11/28/22    Cyst removal by Dr. Yanci Capps    TONSILLECTOMY      WISDOM TOOTH EXTRACTION         The additional following portions of the patient's history were reviewed and updated as appropriate: allergies, current medications, past family history, past medical history, past social history, past surgical history, and problem list.    Review of Systems   Constitutional: Negative.    HENT: Negative.     Eyes: Negative.    Respiratory: Negative.     Cardiovascular: Negative.    Gastrointestinal: Negative.    Endocrine: Negative.    Genitourinary: Negative.    Musculoskeletal: Negative.    Skin: Negative.    Allergic/Immunologic: Negative.    Neurological: Negative.    Hematological: Negative.    Psychiatric/Behavioral: Negative.           I have reviewed and agree with the HPI, ROS, and historical information as entered above. Radha Capps MD          Objective   /82    "Ht 171.5 cm (67.5\")   Wt 105 kg (231 lb)   LMP 08/09/2024 (Exact Date)   BMI 35.65 kg/m²     Physical Exam  Vitals and nursing note reviewed. Exam conducted with a chaperone present.   Constitutional:       Appearance: She is well-developed.   HENT:      Head: Normocephalic and atraumatic.   Neck:      Thyroid: No thyroid mass or thyromegaly.   Cardiovascular:      Rate and Rhythm: Normal rate and regular rhythm.      Heart sounds: No murmur heard.  Pulmonary:      Effort: Pulmonary effort is normal. No retractions.      Breath sounds: Normal breath sounds. No wheezing, rhonchi or rales.   Chest:      Chest wall: No mass or tenderness.   Breasts:     Right: Normal. No mass, nipple discharge, skin change or tenderness.      Left: Normal. No mass, nipple discharge, skin change or tenderness.   Abdominal:      General: Bowel sounds are normal.      Palpations: Abdomen is soft. Abdomen is not rigid. There is no mass.      Tenderness: There is no abdominal tenderness. There is no guarding.      Hernia: No hernia is present. There is no hernia in the left inguinal area.   Genitourinary:     Labia:         Right: No rash, tenderness or lesion.         Left: No rash, tenderness or lesion.       Vagina: Normal. No vaginal discharge or lesions.      Cervix: No cervical motion tenderness, discharge, lesion or cervical bleeding.      Uterus: Normal. Not enlarged, not fixed and not tender.       Adnexa:         Right: No mass or tenderness.          Left: No mass or tenderness.        Rectum: No external hemorrhoid.   Musculoskeletal:      Cervical back: Normal range of motion. No muscular tenderness.   Neurological:      Mental Status: She is alert and oriented to person, place, and time.   Psychiatric:         Behavior: Behavior normal.            Assessment and Plan    Problem List Items Addressed This Visit    None  Visit Diagnoses       Women's annual routine gynecological examination    -  Primary    Encounter for " breast cancer screening using non-mammogram modality        Encounter for surveillance of contraceptive pills                GYN annual well woman exam.   Reviewed pap guidelines.   Recommended use of Vitamin D replacement and getting adequate calcium in her diet. (1500mg)  Reviewed monthly self breast exams.  Instructed to call with lumps, pain, or breast discharge.    Reviewed HPV guidelines.  OCP - happy on current pills. She understands the risks of blood clots, stroke, MI and theoretic increased risks of breast cancer. she does not smoke and has no relevant family h/o blood clots.  Reviewed exercise as a preventative health measures.    Return in about 1 year (around 8/20/2025), or if symptoms worsen or fail to improve.    Radha Capps MD  08/20/2024

## 2025-01-24 RX ORDER — LISINOPRIL 20 MG/1
20 TABLET ORAL DAILY
COMMUNITY

## 2025-03-06 ENCOUNTER — OFFICE VISIT (OUTPATIENT)
Dept: BARIATRICS/WEIGHT MGMT | Facility: CLINIC | Age: 35
End: 2025-03-06
Payer: COMMERCIAL

## 2025-03-06 VITALS
HEART RATE: 94 BPM | BODY MASS INDEX: 31.71 KG/M2 | HEIGHT: 68 IN | WEIGHT: 209.2 LBS | SYSTOLIC BLOOD PRESSURE: 122 MMHG | DIASTOLIC BLOOD PRESSURE: 82 MMHG

## 2025-03-06 DIAGNOSIS — E66.811 CLASS 1 OBESITY WITHOUT SERIOUS COMORBIDITY WITH BODY MASS INDEX (BMI) OF 32.0 TO 32.9 IN ADULT, UNSPECIFIED OBESITY TYPE: Primary | ICD-10-CM

## 2025-03-06 DIAGNOSIS — I10 ESSENTIAL HYPERTENSION: ICD-10-CM

## 2025-03-06 DIAGNOSIS — E78.2 MIXED HYPERLIPIDEMIA: ICD-10-CM

## 2025-03-06 DIAGNOSIS — F90.9 ATTENTION DEFICIT HYPERACTIVITY DISORDER (ADHD), UNSPECIFIED ADHD TYPE: ICD-10-CM

## 2025-03-06 DIAGNOSIS — F41.8 MIXED ANXIETY AND DEPRESSIVE DISORDER: ICD-10-CM

## 2025-03-06 PROBLEM — R03.0 ELEVATED BLOOD-PRESSURE READING WITHOUT DIAGNOSIS OF HYPERTENSION: Status: ACTIVE | Noted: 2019-10-30

## 2025-03-06 PROBLEM — F17.200 TOBACCO DEPENDENCE SYNDROME: Status: ACTIVE | Noted: 2019-10-30

## 2025-03-06 PROBLEM — E66.812 OBESITY, CLASS II, BMI 35-39.9: Status: ACTIVE | Noted: 2025-03-06

## 2025-03-06 RX ORDER — DOCUSATE SODIUM 250 MG
250 CAPSULE ORAL DAILY
Qty: 30 CAPSULE | Refills: 11 | Status: SHIPPED | OUTPATIENT
Start: 2025-03-06

## 2025-03-06 NOTE — ASSESSMENT & PLAN NOTE
Patient's (Body mass index is 32.28 kg/m².) indicates that they are obese (BMI >30) with health conditions that include hypertension . Weight is newly identified. BMI  is above average; BMI management plan is completed. We discussed portion control, increasing exercise, an dragan-based approach such as 3G Multimedia Pal or Lose It, and Information on healthy weight added to patient's after visit summary.     Topics of discussion included obesity as a disease, nutritional education on food groups, exercise, and medications. Patient was instructed in adequate protein, controlled carb and controlled fat intake.   Patient received instructions on using the medicines as a tool in controlling their weight with nutritional and behavioral changes. Risks and benefits were discussed. I believe the potential benefits of medication helping to decrease weight outweighs the risks. Patient is to try nutritonal/behavioral changes only first.   Patient received our clinic education booklet.   Our patient consent form was reviewed including potential risks of weight loss. We also reviewed our confidentiality and HIPPA statements. Patients current FITT score was reviewed along with current capability for exercise tolerance and a patient will work towards a FITT score of:     Frequency   Intensity Time Strength Training   []   0 None  []   0 None  []   0 None  []   0 None    []   1 (1-2x/week) []   1 (light) []   1 (<10 min) []   1 (1x/week)   [x]   2 (3-5x/week) [x]   2 (moderate) []   2 (10-20 min) []   2 (2x/week)   []   3 (daily)   []   3 (moderately hard)  []   4 (very hard) [x]   3 (20-30 min)  []   4 (>30 min) []   3 (3-4x/week)       Patient's past medical history was reviewed in detail and barriers to weight loss were identified and discussed. Past efforts at weight reduction on their own as well as under physician supervision were documented and discussed.  I advised patient to continue routine care with their Primary Care Provider.      Nutritional recommendations and goals were reviewed including Calories: 6282-1529  daily adjusted for exercise calories burnt, Protein:105-132 g daily, Net carbs (total carb - fiber) of 50-75g per day. Fiber 25 g    Start to keep a food journal and bring into next visit in 2 weeks for review. Practice the behavioral modification technique of mindful eating. Take one MVI daily and 2000mg fish oil daily. Take other medications and supplements as directed.    - new patient visit, start weight 209 lb  - log all food in journal until next visit, bring to follow up for review  - prioritize protein, fiber and water, see goals above  - protein for every meal and snack, goal to reach 20-30 g per meal of protein  - incorporate mindful movement with exercise goal to reach 150 minutes weekly, 10,000 steps daily  - labs prior to next visit, will discuss results at follow up visit  - we discussed starting zepbound as she has been taking compounded tirzepatide and has lost 22 lbs. Advised she will need to enroll in the [facility] weight management program and then inform me of when she has completed that meeting.   - Once [facility] weight management is completed will send Zepbound 5 mg weekly injection for weight loss. I/R/B/SE discussed, demo of pen given while in office.   - target weight goal 140-165 lb

## 2025-03-06 NOTE — PATIENT INSTRUCTIONS
Tirzepatide Injection  What is this medication?  TIRZEPATIDE (tir ZEP a tide) promotes weight loss. It may also be used to maintain weight loss. It works by decreasing appetite. Changes to diet and exercise are often combined with this medication.  This medicine may be used for other purposes; ask your health care provider or pharmacist if you have questions.  COMMON BRAND NAME(S): Zepbound  What should I tell my care team before I take this medication?  They need to know if you have any of these conditions:  Eye disease caused by diabetes  Gallbladder disease  History of depression  Pancreatic disease  Kidney disease  Stomach or intestine problems, such as problems digesting food  Suicidal thoughts, plans, or attempt by you or a family member  Personal or family history of MEN 2, a condition that causes endocrine gland tumors  Personal or family history of thyroid cancer  An unusual or allergic reaction to tirzepatide, other medications, foods, dyes, or preservatives  Pregnant or trying to get pregnant  Breastfeeding  How should I use this medication?  This medication is injected under the skin. You will be taught how to prepare and give it. Take it as directed on the prescription label. Keep taking it unless your care team tells you to stop.  It is important that you put your used needles and syringes in a special sharps container. Do not put them in a trash can. If you do not have a sharps container, call your pharmacist or care team to get one.  A special MedGuide will be given to you by the pharmacist with each prescription and refill. Be sure to read this information carefully each time.  This medication comes with INSTRUCTIONS FOR USE. Ask your pharmacist for directions on how to use this medication. Read the information carefully. Talk to your pharmacist or care team if you have questions.  Talk to your care team about the use of this medication in children. Special care may be needed.  Overdosage: If you  think you have taken too much of this medicine contact a poison control center or emergency room at once.  NOTE: This medicine is only for you. Do not share this medicine with others.  What if I miss a dose?  If you miss a dose, take it as soon as you can unless it is more than 4 days (96 hours) late. If it is more than 4 days late, skip the missed dose. Take the next dose at the normal time. Do not take 2 doses within 3 days (72 hours) of each other.  What may interact with this medication?  Certain medications for diabetes, such as insulin, glyburide, glipizide  This medication may affect how other medications work. Talk with your care team about all of the medications you take. They may suggest changes to your treatment plan to lower the risk of side effects and to make sure your medications work as intended.  This list may not describe all possible interactions. Give your health care provider a list of all the medicines, herbs, non-prescription drugs, or dietary supplements you use. Also tell them if you smoke, drink alcohol, or use illegal drugs. Some items may interact with your medicine.  What should I watch for while using this medication?  Visit your care team for regular checks on your progress. It may be some time before you see the benefit from this medication.  Check with your care team if you have severe diarrhea, nausea, and vomiting, or if you sweat a lot. The loss of too much body fluid may make it dangerous for you to take this medication.  Tell your care team if you are taking medications to treat diabetes, such as insulin or sulfonylureas. This may increase your risk of low blood sugar. Know the symptoms of low blood sugar and how to treat it.  Talk to your care team about your risk of cancer. You may be more at risk for certain types of cancer if you take this medication.  Estrogen and progestin hormones may not work as well while you are taking this medication. If you take these as pills by  mouth, your care team may recommend another type of contraception for 4 weeks after you start this medication and for 4 weeks after each dose increase. Talk to your care team about contraceptive options. They can help you find the option that works for you.  What side effects may I notice from receiving this medication?  Side effects that you should report to your care team as soon as possible:  Allergic reactions or angioedema--skin rash, itching or hives, swelling of the face, eyes, lips, tongue, arms, or legs, trouble swallowing or breathing  Bowel blockage--stomach cramping, unable to have a bowel movement or pass gas, loss of appetite, vomiting  Change in vision  Dehydration--increased thirst, dry mouth, feeling faint or lightheaded, headache, dark yellow or brown urine  Gallbladder problems--severe stomach pain, nausea, vomiting, fever  Kidney injury--decrease in the amount of urine, swelling of the ankles, hands, or feet  Pancreatitis--severe stomach pain that spreads to your back or gets worse after eating or when touched, fever, nausea, vomiting  Thoughts of suicide or self-harm, worsening mood, feelings of depression  Thyroid cancer--new mass or lump in the neck, pain or trouble swallowing, trouble breathing, hoarseness  Side effects that usually do not require medical attention (report these to your care team if they continue or are bothersome):  Constipation  Diarrhea  Nausea  Pain, redness, or irritation at injection site  Stomach pain  Upset stomach  Vomiting  This list may not describe all possible side effects. Call your doctor for medical advice about side effects. You may report side effects to FDA at 8-273-FDA-0912.  Where should I keep my medication?  Keep out of the reach of children and pets.  Store in a refrigerator or at room temperature up to 30 degrees C (86 degrees F). Keep it in the original container. Protect from light.  Refrigeration (preferred): Store in the refrigerator. Do not  freeze. Get rid of any unused medication after the expiration date.  Room temperature: This medication may be stored at room temperature for up to 21 days. If it is stored at room temperature, get rid of any unused medication after 21 days or after it expires, whichever is first.  To get rid of medications that are no longer needed or have :  Take the medication to a medication take-back program. Check with your pharmacy or law enforcement to find a location.  If you cannot return the medication, ask your pharmacist or care team how to get rid of this medication safely.  NOTE: This sheet is a summary. It may not cover all possible information. If you have questions about this medicine, talk to your doctor, pharmacist, or health care provider.

## 2025-03-06 NOTE — PROGRESS NOTES
[facility]  [address]  [place] [address]       Date: 2025  Patient Name: Keyonna Truong  MRN: [id number]  : 1990    Subjective     Chief Complaint  Obesity Management consult, nutrition counseling          Keyonna Truong presents to [facility] for obesity management. She was referred by a family friend. She desires to lose about 50 lbs and to improve health and energy.     Weight history:  Highest lifetime weight: 237 pounds. Today's weight is 94.9 kg (209 lb 3.2 oz) pounds.   Weight 5 years ago: 190  She has historically done weight watchers, Keto diet. They temporarily work but they were not sustainable for her. She tends to over eat and struggles with portion control.   She had been doing compounded tirzepatide and recently discovered her insurance will cover name branded injectable drugs.   She is 4 months month into compounded, down 25 lbs at this time      Current lifestyle:   The following seem to sabotage weight loss efforts:enjoyment of food, social events, skipping meals, and convenience food (fast food)  Full time realtor  She has a young child which keeps her moving and busy  She is under stress as her mother is going through chemo and breast cancer treatment.   Exercising: yoga Monday    Has had flu and stomach bug in January    Nutrition  Fast food [employer]   cooks low carb/high protein      Pertinent medical history:    Pancreatitis: No  Glaucoma: No  Headaches: No  HTN: Yes on lisinopril, controlled dr mcfarland  Heart palpitations: No  Thyroid C cell cancer: No  MEN syndrome personal or family hx: No  Nephrolithiasis: No  Gallbladder disease: No  Diabetic Retinopathy: No  SBO/Gastroparesis: No  Etoh use: Yes, weekly 6- glasses of wine (weekends primarily)  Seizures: No          Pertinent family history:  Family History   Problem Relation Age of Onset    Hypertension Mother     Breast cancer  "Mother     No Known Problems Father     Breast cancer Maternal Grandmother     Stomach cancer Paternal Grandfather     Alcohol abuse Neg Hx     Arthritis Neg Hx     Asthma Neg Hx     Birth defects Neg Hx     Bleeding Disorder Neg Hx     Ovarian cancer Neg Hx     Uterine cancer Neg Hx     Colon cancer Neg Hx        Review of Systems   Constitutional:  Negative for appetite change and fatigue.   Eyes:  Negative for visual disturbance.   Cardiovascular:  Negative for chest pain and palpitations.   Gastrointestinal:  Negative for constipation and indigestion.   Neurological:  Negative for light-headedness.   All other systems reviewed and are negative.      PHQ-9 Total Score: 2      Objective     Body mass index is 32.28 kg/m².   Body composition analysis completed and showed:   Body Fat %: 46.9     Measurements (in inches)  Measurements (in inches) Waist Circumference: 45   Neck: 14.5  Chest: 44.5  Hips: 45  Thighs: 38    Vital Signs:   /82 (BP Location: Right arm, Patient Position: Sitting)   Pulse 94   Ht 171.5 cm (67.5\")   Wt 94.9 kg (209 lb 3.2 oz)   BMI 32.28 kg/m²     Physical Exam   General appears stated age and normal appearance   HEENT conjunctivae normal   Chest/lungs Normal rate, Regular rhythm, Breathing is unlabored, and Clear to auscultation bilaterally   Abdomen Soft, normal bowel sounds, without mass or tenderness   Extremities without edema   Neuro Good historian and No focal deficit   Skin Warm, dry, intact   Psych normal behavior, normal thought content, and normal concentration     Result Review :                   Assessment / Plan       Diagnoses and all orders for this visit:    1. Class 1 obesity without serious comorbidity with body mass index (BMI) of 32.0 to 32.9 in adult, unspecified obesity type (Primary)  Overview:  Start weight: (209lb.bmi 32   ): 1st goal (10% weight loss):  189 lb  Target Goal: 140-165 lb  Pregnancy prevention: Loestren OCP    Current medication prescribed by " MWM:   Current meds: adderall;   Rx Options: Wegovy/Zepbound/Sax; all  Rx Caution:     Exercise: walking pad treadmill/Monday yoga for 1.5 hrs  Food:fast food;     Pancreatitis: No  Glaucoma: No  Headaches: No  HTN: Yes on lisinopril, controlled dr mcfarland  Heart palpitations: No  Thyroid C cell cancer: No  MEN syndrome personal or family hx: No  Nephrolithiasis: No  Gallbladder disease: No  Diabetic Retinopathy: No  SBO/Gastroparesis: No  Etoh use: Yes, weekly 6- glasses of wine (weekends primarily)  Seizures: No    Comorbid conditions: HTN, ADD, Anxiety,   Food:   Exercise:     Calories 8348-2174  Protein 105-132 g/day  Water 100 oz      Assessment & Plan:  Patient's (Body mass index is 32.28 kg/m².) indicates that they are obese (BMI >30) with health conditions that include hypertension . Weight is newly identified. BMI  is above average; BMI management plan is completed. We discussed portion control, increasing exercise, an dragan-based approach such as [employer] Pal or Lose It, and Information on healthy weight added to patient's after visit summary.     Topics of discussion included obesity as a disease, nutritional education on food groups, exercise, and medications. Patient was instructed in adequate protein, controlled carb and controlled fat intake.   Patient received instructions on using the medicines as a tool in controlling their weight with nutritional and behavioral changes. Risks and benefits were discussed. I believe the potential benefits of medication helping to decrease weight outweighs the risks. Patient is to try nutritonal/behavioral changes only first.   Patient received our clinic education booklet.   Our patient consent form was reviewed including potential risks of weight loss. We also reviewed our confidentiality and HIPPA statements. Patients current FITT score was reviewed along with current capability for exercise tolerance and a patient will work towards a FITT score of:      Frequency   Intensity Time Strength Training   []   0 None  []   0 None  []   0 None  []   0 None    []   1 (1-2x/week) []   1 (light) []   1 (<10 min) []   1 (1x/week)   [x]   2 (3-5x/week) [x]   2 (moderate) []   2 (10-20 min) []   2 (2x/week)   []   3 (daily)   []   3 (moderately hard)  []   4 (very hard) [x]   3 (20-30 min)  []   4 (>30 min) []   3 (3-4x/week)       Patient's past medical history was reviewed in detail and barriers to weight loss were identified and discussed. Past efforts at weight reduction on their own as well as under physician supervision were documented and discussed.  I advised patient to continue routine care with their Primary Care Provider.     Nutritional recommendations and goals were reviewed including Calories: 6179-2985  daily adjusted for exercise calories burnt, Protein:105-132 g daily, Net carbs (total carb - fiber) of 50-75g per day. Fiber 25 g    Start to keep a food journal and bring into next visit in 2 weeks for review. Practice the behavioral modification technique of mindful eating. Take one MVI daily and 2000mg fish oil daily. Take other medications and supplements as directed.    - new patient visit, start weight 209 lb  - log all food in journal until next visit, bring to follow up for review  - prioritize protein, fiber and water, see goals above  - protein for every meal and snack, goal to reach 20-30 g per meal of protein  - incorporate mindful movement with exercise goal to reach 150 minutes weekly, 10,000 steps daily  - labs prior to next visit, will discuss results at follow up visit  - we discussed starting zepbound as she has been taking compounded tirzepatide and has lost 22 lbs. Advised she will need to enroll in the [facility] weight management program and then inform me of when she has completed that meeting.   - Once [facility] weight management is completed will send Zepbound 5 mg weekly injection for weight loss. I/R/B/SE discussed, demo of pen given while in  office.   - target weight goal 140-165 lb      Orders:  -     Insulin, Total; Future  -     Comprehensive Metabolic Panel; Future  -     Hemoglobin A1c; Future  -     Lipid Panel; Future  -     CBC (No Diff); Future  -     Vitamin D,25-Hydroxy; Future  -     Vitamin B12; Future  -     TSH Rfx On Abnormal To Free T4; Future    2. Essential hypertension  Assessment & Plan:  Hypertension is stable and controlled on Lisinopril, managed by PCP  Continue current treatment regimen.  Weight loss.  Regular aerobic exercise.  Blood pressure will be reassessed  at next scheduled visit .    Orders:  -     Comprehensive Metabolic Panel; Future  -     TSH Rfx On Abnormal To Free T4; Future    3. Attention deficit hyperactivity disorder (ADHD), unspecified ADHD type    4. Mixed anxiety and depressive disorder  -     Vitamin D,25-Hydroxy; Future  -     Vitamin B12; Future  -     TSH Rfx On Abnormal To Free T4; Future    5. Mixed hyperlipidemia  Overview:  Ldl 119, Trig 195 (9/2024)    Assessment & Plan:  Labs prior to next visit, fasting, nothing to eat for 8 hrs prior, only water intake. Further recommendation pending result.        Other orders  -     docusate sodium (COLACE) 250 MG capsule; Take 1 capsule by mouth Daily.  Dispense: 30 capsule; Refill: 11        I spent 86 minutes caring for Keyonna on this date of service. This time includes time spent by me in the following activities:preparing for the visit, reviewing tests, obtaining and/or reviewing a separately obtained history, performing a medically appropriate examination and/or evaluation , counseling and educating the patient/family/caregiver, ordering medications, tests, or procedures, and documenting information in the medical record  Follow Up   Return in about 2 weeks (around 3/20/2025).  Patient was given instructions and counseling regarding her condition or for health maintenance advice. Please see specific information pulled into the AVS if appropriate.      Samia Jin, APRN  03/06/2025

## 2025-03-06 NOTE — ASSESSMENT & PLAN NOTE
Hypertension is stable and controlled on Lisinopril, managed by PCP  Continue current treatment regimen.  Weight loss.  Regular aerobic exercise.  Blood pressure will be reassessed  at next scheduled visit .

## 2025-03-06 NOTE — ASSESSMENT & PLAN NOTE
Labs prior to next visit, fasting, nothing to eat for 8 hrs prior, only water intake. Further recommendation pending result.

## 2025-03-07 ENCOUNTER — PRIOR AUTHORIZATION (OUTPATIENT)
Dept: BARIATRICS/WEIGHT MGMT | Facility: CLINIC | Age: 35
End: 2025-03-07
Payer: COMMERCIAL

## 2025-03-07 DIAGNOSIS — E66.812 OBESITY, CLASS II, BMI 35-39.9: Primary | ICD-10-CM

## 2025-03-07 RX ORDER — TIRZEPATIDE 5 MG/.5ML
5 INJECTION, SOLUTION SUBCUTANEOUS WEEKLY
Qty: 2 ML | Refills: 0 | Status: SHIPPED | OUTPATIENT
Start: 2025-03-07

## 2025-03-07 NOTE — TELEPHONE ENCOUNTER
FARIBA CONTE (Key: [id number])  Zepbound 5MG/0.5ML pen-injectors  ePA cloud logo  Form  [employer] Electronic PA Form (2017 NCPDP        this request is approved from 03/10/2025 to 11/10/2025.

## 2025-03-12 NOTE — PROGRESS NOTES
[facility]  [address]  [place] [address]     Office Note      Date: 2025  Patient Name: Keyonna Truong  MRN: [id number]  : 1990    Subjective     Chief Complaint  Obesity Management follow-up          Keyonna Truong presents to [facility] for obesity management.   Patient is satisfied with weight loss progress. Appetite is well controlled. Zepbound started following last visit. Reports no side effects of prescribed medications today. The patient is not taking multivitamin and is not taking fish oil.  The patient is using a food journal.  She is not feeling like the zepbound dosing is as strong as her compounded dose. Started about 2 weeks ago.       The patient is exercising, walking with daughter daily,  with a FITT score of:    Frequency Intensity Time Strength Training   []   0, none []   0 []   0 [x]   0   [x]   1 (1-2x/week) [x]   1 (light) []   1 (<10 min) []   1 (1x/week)   []   2 (3-5x/week) []   2 (moderate) []   2 (10-20 min) []   2 (2x/week)   []   3 (daily) []   3 (moderately hard)  []   4 (very hard) []   3 (20-30 min)  [x]   4 (>30 min) []   3 (3-4x/week)       Review of Systems   Constitutional:  Negative for appetite change and fatigue.   Eyes:  Negative for visual disturbance.   Cardiovascular:  Negative for chest pain and palpitations.   Gastrointestinal:  Negative for constipation and indigestion.   Neurological:  Negative for light-headedness.         Current Outpatient Medications:     Adderall 15 MG tablet, Take 1 tablet by mouth 2 (Two) Times a Day., Disp: , Rfl:     docusate sodium (COLACE) 250 MG capsule, Take 1 capsule by mouth Daily., Disp: 30 capsule, Rfl: 11    Lexapro 10 MG tablet, Take 1 tablet by mouth Daily., Disp: , Rfl:     lisinopril (PRINIVIL,ZESTRIL) 20 MG tablet, Take 1 tablet by mouth Daily., Disp: , Rfl:     Lo Loestrin Fe 1 MG-10 MCG / 10 MCG tablet, Take 1 tablet by mouth  "Daily., Disp: 84 tablet, Rfl: 4    Tirzepatide-Weight Management (Zepbound) 7.5 MG/0.5ML solution auto-injector, Inject 0.5 mL under the skin into the appropriate area as directed 1 (One) Time Per Week., Disp: 2 mL, Rfl: 0    Objective   Start weight: 209 pounds.    Total Loss lb/%Loss of beginning body weight (BBW): -1.9 lb/-0.67 %  Change in weight since last visit: -1.9 lb    Recent Weight History:   Wt Readings from Last 6 Encounters:   03/20/25 94.3 kg (207 lb 12.8 oz)   03/06/25 94.9 kg (209 lb 3.2 oz)   08/20/24 105 kg (231 lb)   11/09/23 98.4 kg (217 lb)   08/11/23 99.2 kg (218 lb 9.6 oz)   12/13/22 99.7 kg (219 lb 12.8 oz)       Body mass index is 32.07 kg/m².   Body composition analysis completed and showed:   Body Fat %: 47.2%    Measurements (in inches)         Vital Signs:   /90   Pulse 97   Resp 18   Ht 171.5 cm (67.5\")   Wt 94.3 kg (207 lb 12.8 oz)   SpO2 98%   BMI 32.07 kg/m²     Physical Exam   General appears stated age and normal appearance   HEENT    Chest/lungs Normal rate and Breathing is unlabored   Extremities    Neuro Good historian and No focal deficit   Skin    Psych normal behavior, normal thought content, and normal concentration                    Assessment / Plan        Diagnoses and all orders for this visit:    1. Class 1 obesity with serious comorbidity and body mass index (BMI) of 32.0 to 32.9 in adult, unspecified obesity type (Primary)  Overview:  Start weight: (209lb.bmi 32   ): 1st goal (10% weight loss):  189 lb  Target Goal: 140-165 lb  Pregnancy prevention: Loestren OCP    Current medication prescribed by MWM: zepbound 3/2025  Current meds: adderall;   Rx Options: Wegovy/Zepbound/Sax; all  Rx Caution:     Exercise: walking pad treadmill/Monday yoga for 1.5 hrs  Food:fast food;     Pancreatitis: No  Glaucoma: No  Headaches: No  HTN: Yes on lisinopril, controlled dr mcfarland  Heart palpitations: No  Thyroid C cell cancer: No  MEN syndrome personal or family hx: " No  Nephrolithiasis: No  Gallbladder disease: No  Diabetic Retinopathy: No  SBO/Gastroparesis: No  Etoh use: Yes, weekly 6- glasses of wine (weekends primarily)  Seizures: No    Comorbid conditions: HTN, ADD, Anxiety,   Food:   Exercise:     Calories 8816-8817  Protein 105-132 g/day  Water 100 oz      Assessment & Plan:  Patient's (Body mass index is 32.07 kg/m².) indicates that they are obese (BMI >30) with health conditions that include hypertension and dyslipidemias . Weight is improving with treatment. BMI  is above average; BMI management plan is completed. We discussed portion control, increasing exercise, pharmacologic options including zepbound, and an dragan-based approach such as [employer] Pal or Lose It.     I have instructed the patient to continue with pursuit of medical weight loss as a part of this program. Patient does meet criteria for use of anorectics at this time as BMI >30 , hyperlipidemia, hypertension, and is not at treatment goal.     The current plan for this month includes:   - 2 week f/u visit, she is down 2 lbs with lifestyle changes, has been taking compounded GLP-1 from outside provider, recently sent zepbound  - Adjust exercise as discussed  - Weight loss goal 4-6lbs this month  - Continue to work on lifestyle behavioral changes  - Adjust macronutrients as discussed. Bring food journal to next visit for review  - Continue to prioritize protein, fiber, and hydration.   - continue zepbound for weight loss, Increase dose to 7.5 mg weekly injection for weight loss once finished with 5 mg dose  - target weigh goal 140-165 lb    Will review labs at next visit. Continue focus on protein as net carbs and calories are within goal. We will begin to focus on fiber intake at follow up visit.    Orders:  -     Tirzepatide-Weight Management (Zepbound) 7.5 MG/0.5ML solution auto-injector; Inject 0.5 mL under the skin into the appropriate area as directed 1 (One) Time Per Week.  Dispense: 2 mL; Refill:  0    2. Essential hypertension  Assessment & Plan:  Hypertension is stable and controlled  Continue current treatment regimen.  Weight loss.  Regular aerobic exercise.  Blood pressure will be reassessed  next visit .      3. Mixed hyperlipidemia  Overview:  Ldl 119, Trig 195 (9/2024)    Assessment & Plan:   Had labs today, currently pending. Will review at next visit.             Follow Up   Return in about 1 month (around 4/20/2025).  Patient was given instructions and counseling regarding her condition or for health maintenance advice. Please see specific information pulled into the AVS if appropriate.     Samia Jin, APRN  03/20/2025

## 2025-03-20 ENCOUNTER — LAB (OUTPATIENT)
Dept: LAB | Facility: HOSPITAL | Age: 35
End: 2025-03-20
Payer: COMMERCIAL

## 2025-03-20 ENCOUNTER — OFFICE VISIT (OUTPATIENT)
Dept: BARIATRICS/WEIGHT MGMT | Facility: CLINIC | Age: 35
End: 2025-03-20
Payer: COMMERCIAL

## 2025-03-20 VITALS
RESPIRATION RATE: 18 BRPM | WEIGHT: 207.8 LBS | DIASTOLIC BLOOD PRESSURE: 90 MMHG | OXYGEN SATURATION: 98 % | BODY MASS INDEX: 31.49 KG/M2 | SYSTOLIC BLOOD PRESSURE: 126 MMHG | HEART RATE: 97 BPM | HEIGHT: 68 IN

## 2025-03-20 DIAGNOSIS — I10 ESSENTIAL HYPERTENSION: ICD-10-CM

## 2025-03-20 DIAGNOSIS — E78.2 MIXED HYPERLIPIDEMIA: ICD-10-CM

## 2025-03-20 DIAGNOSIS — F41.8 MIXED ANXIETY AND DEPRESSIVE DISORDER: ICD-10-CM

## 2025-03-20 DIAGNOSIS — E66.811 CLASS 1 OBESITY WITH SERIOUS COMORBIDITY AND BODY MASS INDEX (BMI) OF 32.0 TO 32.9 IN ADULT, UNSPECIFIED OBESITY TYPE: Primary | ICD-10-CM

## 2025-03-20 DIAGNOSIS — E66.811 CLASS 1 OBESITY WITHOUT SERIOUS COMORBIDITY WITH BODY MASS INDEX (BMI) OF 32.0 TO 32.9 IN ADULT, UNSPECIFIED OBESITY TYPE: ICD-10-CM

## 2025-03-20 LAB
25(OH)D3 SERPL-MCNC: 29 NG/ML (ref 30–100)
ALBUMIN SERPL-MCNC: 4.6 G/DL (ref 3.5–5.2)
ALBUMIN/GLOB SERPL: 1.5 G/DL
ALP SERPL-CCNC: 49 U/L (ref 39–117)
ALT SERPL W P-5'-P-CCNC: 22 U/L (ref 1–33)
ANION GAP SERPL CALCULATED.3IONS-SCNC: 10.4 MMOL/L (ref 5–15)
AST SERPL-CCNC: 21 U/L (ref 1–32)
BILIRUB SERPL-MCNC: 0.3 MG/DL (ref 0–1.2)
BUN SERPL-MCNC: 13 MG/DL (ref 6–20)
BUN/CREAT SERPL: 19.1 (ref 7–25)
CALCIUM SPEC-SCNC: 9.4 MG/DL (ref 8.6–10.5)
CHLORIDE SERPL-SCNC: 102 MMOL/L (ref 98–107)
CHOLEST SERPL-MCNC: 207 MG/DL (ref 0–200)
CO2 SERPL-SCNC: 23.6 MMOL/L (ref 22–29)
CREAT SERPL-MCNC: 0.68 MG/DL (ref 0.57–1)
DEPRECATED RDW RBC AUTO: 43.6 FL (ref 37–54)
EGFRCR SERPLBLD CKD-EPI 2021: 116.6 ML/MIN/1.73
ERYTHROCYTE [DISTWIDTH] IN BLOOD BY AUTOMATED COUNT: 13.2 % (ref 12.3–15.4)
GLOBULIN UR ELPH-MCNC: 3 GM/DL
GLUCOSE SERPL-MCNC: 85 MG/DL (ref 65–99)
HBA1C MFR BLD: 5.3 % (ref 4.8–5.6)
HCT VFR BLD AUTO: 41.9 % (ref 34–46.6)
HDLC SERPL-MCNC: 50 MG/DL (ref 40–60)
HGB BLD-MCNC: 13.7 G/DL (ref 12–15.9)
LDLC SERPL CALC-MCNC: 130 MG/DL (ref 0–100)
LDLC/HDLC SERPL: 2.53 {RATIO}
MCH RBC QN AUTO: 29.5 PG (ref 26.6–33)
MCHC RBC AUTO-ENTMCNC: 32.7 G/DL (ref 31.5–35.7)
MCV RBC AUTO: 90.3 FL (ref 79–97)
PLATELET # BLD AUTO: 339 10*3/MM3 (ref 140–450)
PMV BLD AUTO: 10.5 FL (ref 6–12)
POTASSIUM SERPL-SCNC: 4.4 MMOL/L (ref 3.5–5.2)
PROT SERPL-MCNC: 7.6 G/DL (ref 6–8.5)
RBC # BLD AUTO: 4.64 10*6/MM3 (ref 3.77–5.28)
SODIUM SERPL-SCNC: 136 MMOL/L (ref 136–145)
TRIGL SERPL-MCNC: 152 MG/DL (ref 0–150)
TSH SERPL DL<=0.05 MIU/L-ACNC: 2.26 UIU/ML (ref 0.27–4.2)
VIT B12 BLD-MCNC: 345 PG/ML (ref 211–946)
VLDLC SERPL-MCNC: 27 MG/DL (ref 5–40)
WBC NRBC COR # BLD AUTO: 5.21 10*3/MM3 (ref 3.4–10.8)

## 2025-03-20 PROCEDURE — 82306 VITAMIN D 25 HYDROXY: CPT

## 2025-03-20 PROCEDURE — 80050 GENERAL HEALTH PANEL: CPT

## 2025-03-20 PROCEDURE — 83525 ASSAY OF INSULIN: CPT

## 2025-03-20 PROCEDURE — 83036 HEMOGLOBIN GLYCOSYLATED A1C: CPT

## 2025-03-20 PROCEDURE — 36415 COLL VENOUS BLD VENIPUNCTURE: CPT

## 2025-03-20 PROCEDURE — 99214 OFFICE O/P EST MOD 30 MIN: CPT

## 2025-03-20 PROCEDURE — 80061 LIPID PANEL: CPT

## 2025-03-20 PROCEDURE — 82607 VITAMIN B-12: CPT

## 2025-03-20 RX ORDER — TIRZEPATIDE 7.5 MG/.5ML
7.5 INJECTION, SOLUTION SUBCUTANEOUS WEEKLY
Qty: 2 ML | Refills: 0 | Status: SHIPPED | OUTPATIENT
Start: 2025-03-20

## 2025-03-20 NOTE — ASSESSMENT & PLAN NOTE
Hypertension is stable and controlled  Continue current treatment regimen.  Weight loss.  Regular aerobic exercise.  Blood pressure will be reassessed  next visit .

## 2025-03-20 NOTE — ASSESSMENT & PLAN NOTE
Patient's (Body mass index is 32.07 kg/m².) indicates that they are obese (BMI >30) with health conditions that include hypertension and dyslipidemias . Weight is improving with treatment. BMI  is above average; BMI management plan is completed. We discussed portion control, increasing exercise, pharmacologic options including zepbound, and an dragan-based approach such as [employer] Pal or Lose It.     I have instructed the patient to continue with pursuit of medical weight loss as a part of this program. Patient does meet criteria for use of anorectics at this time as BMI >30 , hyperlipidemia, hypertension, and is not at treatment goal.     The current plan for this month includes:   - 2 week f/u visit, she is down 2 lbs with lifestyle changes, has been taking compounded GLP-1 from outside provider, recently sent zepbound  - Adjust exercise as discussed  - Weight loss goal 4-6lbs this month  - Continue to work on lifestyle behavioral changes  - Adjust macronutrients as discussed. Bring food journal to next visit for review  - Continue to prioritize protein, fiber, and hydration.   - continue zepbound for weight loss, Increase dose to 7.5 mg weekly injection for weight loss once finished with 5 mg dose  - target weigh goal 140-165 lb    Will review labs at next visit. Continue focus on protein as net carbs and calories are within goal. We will begin to focus on fiber intake at follow up visit.

## 2025-03-21 LAB — INSULIN SERPL-ACNC: 9.3 UIU/ML (ref 2.6–24.9)

## 2025-04-15 PROBLEM — E55.9 VITAMIN D INSUFFICIENCY: Status: ACTIVE | Noted: 2025-04-15

## 2025-04-15 NOTE — PROGRESS NOTES
[facility]  [address]  [place] [address]     Office Note      Date: 2025  Patient Name: Keyonna Truong  MRN: [id number]  : 1990    Subjective     Chief Complaint  Obesity Management follow-up          Keyonna Truong presents to [facility] for obesity management.   Patient is satisfied with weight loss progress. Appetite is well controlled. On Zepbound.  Reports no side effects of prescribed medications today. The patient is not taking multivitamin and is not taking fish oil.  The patient is using a food journal.  She hurt her back after we saw each other last. She has taken steroid and muscle relaxer. She will start PT today. She plans to invest in ring for tracking fitness soon. She feels her appetite has been lower with pain. She feels it has been difficult to eat much of anything.         The patient is exercising with a FITT score of:    Frequency Intensity Time Strength Training   []   0, none []   0 []   0 []   0   [x]   1 (1-2x/week) [x]   1 (light) []   1 (<10 min) [x]   1 (1x/week)   []   2 (3-5x/week) []   2 (moderate) []   2 (10-20 min) []   2 (2x/week)   []   3 (daily) []   3 (moderately hard)  []   4 (very hard) []   3 (20-30 min)  [x]   4 (>30 min) []   3 (3-4x/week)       Review of Systems   Constitutional:  Negative for appetite change and fatigue.   Eyes:  Negative for visual disturbance.   Cardiovascular:  Negative for chest pain and palpitations.   Gastrointestinal:  Negative for constipation and indigestion.   Neurological:  Negative for light-headedness.         Current Outpatient Medications:     Adderall 15 MG tablet, Take 1 tablet by mouth 2 (Two) Times a Day., Disp: , Rfl:     diclofenac (VOLTAREN) 75 MG EC tablet, Take 1 tablet by mouth 2 (Two) Times a Day., Disp: , Rfl:     docusate sodium (COLACE) 250 MG capsule, Take 1 capsule by mouth Daily., Disp: 30 capsule, Rfl: 11    Lexapro 10 MG  "tablet, Take 1 tablet by mouth Daily., Disp: , Rfl:     lisinopril (PRINIVIL,ZESTRIL) 20 MG tablet, Take 1 tablet by mouth Daily., Disp: , Rfl:     Lo Loestrin Fe 1 MG-10 MCG / 10 MCG tablet, Take 1 tablet by mouth Daily., Disp: 84 tablet, Rfl: 4    methocarbamol (ROBAXIN) 750 MG tablet, Take 1 tablet by mouth 3 times a day., Disp: , Rfl:     Tirzepatide-Weight Management (Zepbound) 10 MG/0.5ML solution auto-injector, Inject 0.5 mL under the skin into the appropriate area as directed 1 (One) Time Per Week., Disp: 2 mL, Rfl: 0    vitamin D (ERGOCALCIFEROL) 1.25 MG (33941 UT) capsule capsule, Take 1 capsule by mouth 1 (One) Time Per Week., Disp: 12 capsule, Rfl: 0    Objective   Start weight: 209.2 pounds.    Total Loss lb/%Loss of beginning body weight (BBW): -6.2 lb/-2.96%  Change in weight since last visit: -4.8 lb.    Recent Weight History:   Wt Readings from Last 6 Encounters:   04/17/25 92.1 kg (203 lb)   03/20/25 94.3 kg (207 lb 12.8 oz)   03/06/25 94.9 kg (209 lb 3.2 oz)   08/20/24 105 kg (231 lb)   11/09/23 98.4 kg (217 lb)   08/11/23 99.2 kg (218 lb 9.6 oz)       Body mass index is 31.33 kg/m².   Body composition analysis completed and showed:   Body Fat %: 47.0%    Measurements (in inches)         Vital Signs:   /80   Pulse 95   Resp 18   Ht 171.5 cm (67.5\")   Wt 92.1 kg (203 lb)   SpO2 98%   BMI 31.33 kg/m²     Physical Exam   General appears stated age and normal appearance   HEENT    Chest/lungs Normal rate and Breathing is unlabored   Extremities    Neuro Good historian and No focal deficit   Skin    Psych normal behavior, normal thought content, and normal concentration       Common labs          3/20/2025    09:43   Common Labs   Glucose 85    BUN 13    Creatinine 0.68    Sodium 136    Potassium 4.4    Chloride 102    Calcium 9.4    Albumin 4.6    Total Bilirubin 0.3    Alkaline Phosphatase 49    AST (SGOT) 21    ALT (SGPT) 22    WBC 5.21    Hemoglobin 13.7    Hematocrit 41.9    Platelets " 339    Total Cholesterol 207    Triglycerides 152    HDL Cholesterol 50    LDL Cholesterol  130    Hemoglobin A1C 5.30      TSH          3/20/2025    09:43   TSH   TSH 2.260               Assessment / Plan        Diagnoses and all orders for this visit:    1. Obesity, Class II, BMI 35-39.9 (Primary)  Overview:  Start weight: (209lb.bmi 32   ): 1st goal (10% weight loss):  189 lb  Target Goal: 140-165 lb  Pregnancy prevention: Loestren OCP    Current medication prescribed by [facility]: zepbound 3/2025  Current meds: adderall;   Rx Options: Wegovy/Zepbound/Sax; all  Rx Caution:     Exercise: walking pad treadmill/Monday yoga for 1.5 hrs  Food:fast food;     Pancreatitis: No  Glaucoma: No  Headaches: No  HTN: Yes on lisinopril, controlled dr mcfarland  Heart palpitations: No  Thyroid C cell cancer: No  MEN syndrome personal or family hx: No  Nephrolithiasis: No  Gallbladder disease: No  Diabetic Retinopathy: No  SBO/Gastroparesis: No  Etoh use: Yes, weekly 6- glasses of wine (weekends primarily)  Seizures: No    Comorbid conditions: HTN, ADD, Anxiety,   Food:   Exercise:     Calories 2958-0725  Protein 105-132 g/day  Water 100 oz      Assessment & Plan:  Patient's (Body mass index is 31.33 kg/m².) indicates that they are obese (BMI >30) with health conditions that include hypertension . Weight is improving with treatment. BMI  is above average; BMI management plan is completed. We discussed portion control, increasing exercise, pharmacologic options including zepbound, and an dragan-based approach such as [employer] Pal or Lose It.     I have instructed the patient to continue with pursuit of medical weight loss as a part of this program. Patient does meet criteria for use of anorectics at this time as BMI >30 , hypertension, is not at treatment goal, and this medication is indicated for LONG TERM use for management of obesity.     The current plan for this month includes:   - 1 month f/u visit, she is down 4 lbs on zepbound  -  Adjust exercise as discussed  - Increase water to recommended daily amount  - Weight loss goal 4-6lbs this month  - Continue to work on lifestyle behavioral changes  - Adjust macronutrients as discussed. Bring food journal to next visit for review  - Continue to prioritize protein, fiber, and hydration.   - continue zepbound weekly for weight loss, increase to 10 mg  - B12 today for fatigue, low normal per lab  - target weight goal 140-165 lb    Limit exercise until cleared by PT. Continue focus on protein and fiber, limit carbs. Good work this past month.       2. Essential hypertension  Assessment & Plan:  Hypertension is stable and controlled  Continue current treatment regimen.  Regular aerobic exercise.  Stop smoking.  Blood pressure will be reassessed  at next visit .      3. Vitamin D insufficiency  Overview:  Vit D 29 (3/2025); start supp 4/2025; lab due 8/2025    Assessment & Plan:  Start vitamin D 50,000 once weekly for 3 months, lab due Aug 2025      4. Mixed hyperlipidemia  Overview:  Ldl 130, Trig 152 (3/2025); Ldl 119, Trig 195 (9/2024)    Assessment & Plan:   Stable with mixed elevation. No meds. Continue lifestyle changes. Lab due 9/2025      Other orders  -     vitamin D (ERGOCALCIFEROL) 1.25 MG (71762 UT) capsule capsule; Take 1 capsule by mouth 1 (One) Time Per Week.  Dispense: 12 capsule; Refill: 0  -     Tirzepatide-Weight Management (Zepbound) 10 MG/0.5ML solution auto-injector; Inject 0.5 mL under the skin into the appropriate area as directed 1 (One) Time Per Week.  Dispense: 2 mL; Refill: 0          Follow Up   Return in about 1 month (around 5/17/2025).  Patient was given instructions and counseling regarding her condition or for health maintenance advice. Please see specific information pulled into the AVS if appropriate.     Samia Jin, APRN  04/17/2025

## 2025-04-17 ENCOUNTER — OFFICE VISIT (OUTPATIENT)
Dept: BARIATRICS/WEIGHT MGMT | Facility: CLINIC | Age: 35
End: 2025-04-17
Payer: COMMERCIAL

## 2025-04-17 VITALS
DIASTOLIC BLOOD PRESSURE: 80 MMHG | RESPIRATION RATE: 18 BRPM | OXYGEN SATURATION: 98 % | BODY MASS INDEX: 30.77 KG/M2 | HEIGHT: 68 IN | HEART RATE: 95 BPM | WEIGHT: 203 LBS | SYSTOLIC BLOOD PRESSURE: 118 MMHG

## 2025-04-17 DIAGNOSIS — E66.812 OBESITY, CLASS II, BMI 35-39.9: Primary | ICD-10-CM

## 2025-04-17 DIAGNOSIS — I10 ESSENTIAL HYPERTENSION: ICD-10-CM

## 2025-04-17 DIAGNOSIS — E55.9 VITAMIN D INSUFFICIENCY: ICD-10-CM

## 2025-04-17 DIAGNOSIS — E78.2 MIXED HYPERLIPIDEMIA: ICD-10-CM

## 2025-04-17 PROCEDURE — 99214 OFFICE O/P EST MOD 30 MIN: CPT

## 2025-04-17 RX ORDER — DICLOFENAC SODIUM 75 MG/1
75 TABLET, DELAYED RELEASE ORAL 2 TIMES DAILY
COMMUNITY
Start: 2025-04-15

## 2025-04-17 RX ORDER — CYANOCOBALAMIN 1000 UG/ML
1000 INJECTION, SOLUTION INTRAMUSCULAR; SUBCUTANEOUS
Status: SHIPPED | OUTPATIENT
Start: 2025-04-17

## 2025-04-17 RX ORDER — TIRZEPATIDE 10 MG/.5ML
10 INJECTION, SOLUTION SUBCUTANEOUS WEEKLY
Qty: 2 ML | Refills: 0 | Status: SHIPPED | OUTPATIENT
Start: 2025-04-17

## 2025-04-17 RX ORDER — ERGOCALCIFEROL 1.25 MG/1
50000 CAPSULE, LIQUID FILLED ORAL WEEKLY
Qty: 12 CAPSULE | Refills: 0 | Status: SHIPPED | OUTPATIENT
Start: 2025-04-17

## 2025-04-17 RX ORDER — METHOCARBAMOL 750 MG/1
1 TABLET, FILM COATED ORAL 3 TIMES DAILY
COMMUNITY
Start: 2025-03-24

## 2025-04-17 RX ADMIN — CYANOCOBALAMIN 1000 MCG: 1000 INJECTION, SOLUTION INTRAMUSCULAR; SUBCUTANEOUS at 14:09

## 2025-04-17 NOTE — ASSESSMENT & PLAN NOTE
Patient's (Body mass index is 31.33 kg/m².) indicates that they are obese (BMI >30) with health conditions that include hypertension . Weight is improving with treatment. BMI  is above average; BMI management plan is completed. We discussed portion control, increasing exercise, pharmacologic options including zepbound, and an dragan-based approach such as [employer] Pal or Lose It.     I have instructed the patient to continue with pursuit of medical weight loss as a part of this program. Patient does meet criteria for use of anorectics at this time as BMI >30 , hypertension, is not at treatment goal, and this medication is indicated for LONG TERM use for management of obesity.     The current plan for this month includes:   - 1 month f/u visit, she is down 4 lbs on zepbound  - Adjust exercise as discussed  - Increase water to recommended daily amount  - Weight loss goal 4-6lbs this month  - Continue to work on lifestyle behavioral changes  - Adjust macronutrients as discussed. Bring food journal to next visit for review  - Continue to prioritize protein, fiber, and hydration.   - continue zepbound weekly for weight loss, increase to 10 mg  - B12 today for fatigue, low normal per lab  - target weight goal 140-165 lb    Limit exercise until cleared by PT. Continue focus on protein and fiber, limit carbs. Good work this past month.

## 2025-04-17 NOTE — ASSESSMENT & PLAN NOTE
Hypertension is stable and controlled  Continue current treatment regimen.  Regular aerobic exercise.  Stop smoking.  Blood pressure will be reassessed  at next visit .

## 2025-05-13 NOTE — PROGRESS NOTES
[facility]  [address]  [place] [address]     Office Note      Date: 05/15/2025  Patient Name: Keyonna Truong  MRN: [id number]  : 1990    Subjective     Chief Complaint  Obesity Management follow-up          Keyonna Truong presents to [facility] for obesity management.   Patient is satisfied with weight loss progress. Appetite is well controlled. On zepbound. Reports no side effects of prescribed medications today. The patient is not taking multivitamin and is not taking fish oil.  The patient is using a food journal.  She had recent injury, has been in PT, bulging lumbar disc, will be having surgery soon. She has had some trouble with eating given these circumstances, she is not physically able to do some things.     The patient is exercising with a FITT score of:    Frequency Intensity Time Strength Training   [x]   0, none [x]   0 [x]   0 [x]   0   []   1 (1-2x/week) []   1 (light) []   1 (<10 min) []   1 (1x/week)   []   2 (3-5x/week) []   2 (moderate) []   2 (10-20 min) []   2 (2x/week)   []   3 (daily) []   3 (moderately hard)  []   4 (very hard) []   3 (20-30 min)  []   4 (>30 min) []   3 (3-4x/week)       Review of Systems   Constitutional:  Negative for appetite change and fatigue.   Eyes:  Negative for visual disturbance.   Cardiovascular:  Negative for chest pain and palpitations.   Gastrointestinal:  Negative for constipation and indigestion.   Neurological:  Negative for light-headedness.         Current Outpatient Medications:     Adderall 15 MG tablet, Take 1 tablet by mouth 2 (Two) Times a Day., Disp: , Rfl:     diclofenac (VOLTAREN) 75 MG EC tablet, Take 1 tablet by mouth 2 (Two) Times a Day., Disp: , Rfl:     docusate sodium (COLACE) 250 MG capsule, Take 1 capsule by mouth Daily., Disp: 30 capsule, Rfl: 11    gabapentin (NEURONTIN) 300 MG capsule, Take 1 capsule by mouth 3 (Three) Times a Day., Disp: ,  "Rfl:     HYDROcodone-acetaminophen (NORCO) 5-325 MG per tablet, Take 1 tablet by mouth Every 6 (Six) Hours As Needed., Disp: , Rfl:     Lexapro 10 MG tablet, Take 1 tablet by mouth Daily., Disp: , Rfl:     lisinopril (PRINIVIL,ZESTRIL) 20 MG tablet, Take 1 tablet by mouth Daily., Disp: , Rfl:     Lo Loestrin Fe 1 MG-10 MCG / 10 MCG tablet, Take 1 tablet by mouth Daily., Disp: 84 tablet, Rfl: 4    methocarbamol (ROBAXIN) 750 MG tablet, Take 1 tablet by mouth 3 times a day., Disp: , Rfl:     Tirzepatide-Weight Management (Zepbound) 10 MG/0.5ML solution auto-injector, Inject 0.5 mL under the skin into the appropriate area as directed 1 (One) Time Per Week., Disp: 2 mL, Rfl: 0    vitamin D (ERGOCALCIFEROL) 1.25 MG (42458 UT) capsule capsule, Take 1 capsule by mouth 1 (One) Time Per Week., Disp: 12 capsule, Rfl: 0    valACYclovir (VALTREX) 1000 MG tablet, Take 1 tablet by mouth 2 (Two) Times a Day. (Patient not taking: Reported on 5/15/2025), Disp: , Rfl:     Current Facility-Administered Medications:     cyanocobalamin injection 1,000 mcg, 1,000 mcg, Intramuscular, Q28 Days, Samia Jin, [place], 1,000 mcg at 04/17/25 1409    Objective   Start weight: 209.2 pounds.    Total Loss lb/%Loss of beginning body weight (BBW): -12lb/-5.74%  Change in weight since last visit: -5.8    Recent Weight History:   Wt Readings from Last 6 Encounters:   05/15/25 89.4 kg (197 lb 3.2 oz)   04/17/25 92.1 kg (203 lb)   03/20/25 94.3 kg (207 lb 12.8 oz)   03/06/25 94.9 kg (209 lb 3.2 oz)   08/20/24 105 kg (231 lb)   11/09/23 98.4 kg (217 lb)       Body mass index is 30.43 kg/m².   Body composition analysis completed and showed:   Body Fat %: 46.0%    Measurements (in inches)  Waist Circumference: 44.5      Vital Signs:   /64   Pulse 78   Resp 18   Ht 171.5 cm (67.5\")   Wt 89.4 kg (197 lb 3.2 oz)   SpO2 98%   BMI 30.43 kg/m²     Physical Exam   General appears stated age and normal appearance   HEENT    Chest/lungs Normal " rate and Breathing is unlabored   Extremities    Neuro Good historian and No focal deficit   Skin    Psych normal behavior, normal thought content, and normal concentration       Common labs          3/20/2025    09:43   Common Labs   Glucose 85    BUN 13    Creatinine 0.68    Sodium 136    Potassium 4.4    Chloride 102    Calcium 9.4    Albumin 4.6    Total Bilirubin 0.3    Alkaline Phosphatase 49    AST (SGOT) 21    ALT (SGPT) 22    WBC 5.21    Hemoglobin 13.7    Hematocrit 41.9    Platelets 339    Total Cholesterol 207    Triglycerides 152    HDL Cholesterol 50    LDL Cholesterol  130    Hemoglobin A1C 5.30      TSH          3/20/2025    09:43   TSH   TSH 2.260               Assessment / Plan        Diagnoses and all orders for this visit:    1. Obesity, Class II, BMI 35-39.9 (Primary)  Overview:  Start weight: (209lb.bmi 32   ): 1st goal (10% weight loss):  189 lb  Target Goal: 140-165 lb  Pregnancy prevention: Loestren OCP    Current medication prescribed by [facility]: zepbound 3/2025  Current meds: adderall;   Rx Options: Wegovy/Zepbound/Sax; all  Rx Caution:     Exercise: walking pad treadmill/Monday yoga for 1.5 hrs  Food:fast food;     Pancreatitis: No  Glaucoma: No  Headaches: No  HTN: Yes on lisinopril, controlled dr mcfarland  Heart palpitations: No  Thyroid C cell cancer: No  MEN syndrome personal or family hx: No  Nephrolithiasis: No  Gallbladder disease: No  Diabetic Retinopathy: No  SBO/Gastroparesis: No  Etoh use: Yes, weekly 6- glasses of wine (weekends primarily)  Seizures: No    Comorbid conditions: HTN, ADD, Anxiety,   Food:   Exercise:     Calories 4922-6444  Protein 105-132 g/day  Water 100 oz      Assessment & Plan:  Patient's (Body mass index is 30.43 kg/m².) indicates that they are obese (BMI >30) with health conditions that include hypertension . Weight is improving with treatment. BMI  is above average; BMI management plan is completed. We discussed portion control, increasing exercise,  pharmacologic options including zepbound, and an dragan-based approach such as [employer] Pal or Lose It.     I have instructed the patient to continue with pursuit of medical weight loss as a part of this program. Patient does meet criteria for use of anorectics at this time as BMI >30 , hypertension, and is not at treatment goal.     The current plan for this month includes:   - 1 month f/u visit, she is down 5.8 lbs on zepbound   - Weight loss goal 4-6lbs this month  - Continue to work on lifestyle behavioral changes  - Continue nutrition focus  - Continue to prioritize protein, fiber, and hydration.   - continue zepbound 10 mg weekly for weight loss  - advised to hold medication for 2 weeks prior to surgery, do not restart if significant constipation and at discretion of surgeon  - given recent [facility] change, we will have to switch to wegovy July 1 2025  - target weight loss goal 140-165    Orders:  -     Tirzepatide-Weight Management (Zepbound) 10 MG/0.5ML solution auto-injector; Inject 0.5 mL under the skin into the appropriate area as directed 1 (One) Time Per Week.  Dispense: 2 mL; Refill: 0    2. Essential hypertension  Assessment & Plan:  Hypertension is stable and controlled  Continue current treatment regimen.  Regular aerobic exercise.  Stop smoking.  Blood pressure will be reassessed  at next visit .      3. Mixed hyperlipidemia  Overview:  Ldl 130, Trig 152 (3/2025); Ldl 119, Trig 195 (9/2024)    Assessment & Plan:   Elevated, stable, continue lifestyle modifications with weight reduction      4. Vitamin D insufficiency  Overview:  Vit D 29 (3/2025); start supp 4/2025; lab due 8/2025    Assessment & Plan:  Lab due August 2025            Follow Up   Return in about 1 month (around 6/15/2025).  Patient was given instructions and counseling regarding her condition or for health maintenance advice. Please see specific information pulled into the AVS if appropriate.     Samia Jin, APRN  05/15/2025

## 2025-05-15 ENCOUNTER — OFFICE VISIT (OUTPATIENT)
Dept: BARIATRICS/WEIGHT MGMT | Facility: CLINIC | Age: 35
End: 2025-05-15
Payer: COMMERCIAL

## 2025-05-15 VITALS
SYSTOLIC BLOOD PRESSURE: 108 MMHG | DIASTOLIC BLOOD PRESSURE: 64 MMHG | OXYGEN SATURATION: 98 % | WEIGHT: 197.2 LBS | RESPIRATION RATE: 18 BRPM | BODY MASS INDEX: 29.89 KG/M2 | HEART RATE: 78 BPM | HEIGHT: 68 IN

## 2025-05-15 DIAGNOSIS — I10 ESSENTIAL HYPERTENSION: ICD-10-CM

## 2025-05-15 DIAGNOSIS — E66.812 OBESITY, CLASS II, BMI 35-39.9: Primary | ICD-10-CM

## 2025-05-15 DIAGNOSIS — E55.9 VITAMIN D INSUFFICIENCY: ICD-10-CM

## 2025-05-15 DIAGNOSIS — E78.2 MIXED HYPERLIPIDEMIA: ICD-10-CM

## 2025-05-15 PROCEDURE — 99214 OFFICE O/P EST MOD 30 MIN: CPT

## 2025-05-15 RX ORDER — TIRZEPATIDE 10 MG/.5ML
10 INJECTION, SOLUTION SUBCUTANEOUS WEEKLY
Qty: 2 ML | Refills: 0 | Status: SHIPPED | OUTPATIENT
Start: 2025-05-15

## 2025-05-15 RX ORDER — HYDROCODONE BITARTRATE AND ACETAMINOPHEN 5; 325 MG/1; MG/1
1 TABLET ORAL EVERY 6 HOURS PRN
COMMUNITY
Start: 2025-05-09

## 2025-05-15 RX ORDER — GABAPENTIN 300 MG/1
300 CAPSULE ORAL 3 TIMES DAILY
COMMUNITY
Start: 2025-04-18

## 2025-05-15 RX ORDER — VALACYCLOVIR HYDROCHLORIDE 1 G/1
1000 TABLET, FILM COATED ORAL 2 TIMES DAILY
COMMUNITY
Start: 2025-05-06

## 2025-05-15 NOTE — ASSESSMENT & PLAN NOTE
Patient's (Body mass index is 30.43 kg/m².) indicates that they are obese (BMI >30) with health conditions that include hypertension . Weight is improving with treatment. BMI  is above average; BMI management plan is completed. We discussed portion control, increasing exercise, pharmacologic options including zepbound, and an dragan-based approach such as [employer] Pal or Lose It.     I have instructed the patient to continue with pursuit of medical weight loss as a part of this program. Patient does meet criteria for use of anorectics at this time as BMI >30 , hypertension, and is not at treatment goal.     The current plan for this month includes:   - 1 month f/u visit, she is down 5.8 lbs on zepbound   - Weight loss goal 4-6lbs this month  - Continue to work on lifestyle behavioral changes  - Continue nutrition focus  - Continue to prioritize protein, fiber, and hydration.   - continue zepbound 10 mg weekly for weight loss  - advised to hold medication for 2 weeks prior to surgery, do not restart if significant constipation and at discretion of surgeon  - given recent KSE change, we will have to switch to wegovy July 1 2025  - target weight loss goal 140-165

## 2025-06-13 PROBLEM — M54.16 LUMBAR RADICULOPATHY: Status: ACTIVE | Noted: 2025-04-23

## 2025-06-13 NOTE — PROGRESS NOTES
[facility] [place] [place] [facility]  [address]  [place] [address]     Office Note      Date: 2025  Patient Name: Keyonna Truong  MRN: [id number]  : 1990    Subjective     Chief Complaint  Obesity Management follow-up          Keyonna Truong presents to [facility] for obesity management.   Patient is satisfied with weight loss progress. Appetite is well controlled. On Zepbound. Reports mild nausea. The patient is not taking multivitamin and is not taking fish oil.  The patient is using a food journal.  She feels her nutrition has been good. She does admit that following surgery she felt very tired, was having some pain and did struggle with logging food at this time. She has gotten better with logging recently, meal plan. She also feels it has deterred alcohol.       The patient is exercising with a FITT score of:    Frequency Intensity Time Strength Training   []   0, none []   0 []   0 [x]   0   [x]   1 (1-2x/week) [x]   1 (light) []   1 (<10 min) []   1 (1x/week)   []   2 (3-5x/week) []   2 (moderate) []   2 (10-20 min) []   2 (2x/week)   []   3 (daily) []   3 (moderately hard)  []   4 (very hard) [x]   3 (20-30 min)  []   4 (>30 min) []   3 (3-4x/week)       Review of Systems   Constitutional:  Negative for appetite change and fatigue.   Eyes:  Negative for visual disturbance.   Cardiovascular:  Negative for chest pain and palpitations.   Gastrointestinal:  Positive for nausea. Negative for constipation and indigestion.   Neurological:  Negative for light-headedness.         Current Outpatient Medications:     Adderall 15 MG tablet, Take 1 tablet by mouth 2 (Two) Times a Day., Disp: , Rfl:     docusate sodium (COLACE) 250 MG capsule, Take 1 capsule by mouth Daily., Disp: 30 capsule, Rfl: 11    Lexapro 10 MG tablet, Take 1 tablet by mouth Daily., Disp: , Rfl:     lisinopril (PRINIVIL,ZESTRIL) 20 MG tablet, Take 1 tablet by mouth Daily.,  "Disp: , Rfl:     Lo Loestrin Fe 1 MG-10 MCG / 10 MCG tablet, Take 1 tablet by mouth Daily., Disp: 84 tablet, Rfl: 4    Tirzepatide-Weight Management (Zepbound) 10 MG/0.5ML solution auto-injector, Inject 0.5 mL under the skin into the appropriate area as directed 1 (One) Time Per Week., Disp: 6 mL, Rfl: 0    vitamin D (ERGOCALCIFEROL) 1.25 MG (16200 UT) capsule capsule, Take 1 capsule by mouth 1 (One) Time Per Week., Disp: 12 capsule, Rfl: 0    Current Facility-Administered Medications:     cyanocobalamin injection 1,000 mcg, 1,000 mcg, Intramuscular, Q28 Days, Samia Jin APRN, 1,000 mcg at 06/19/25 1531    Objective   Start weight: 209.2 pounds.    Total Loss lb/%Loss of beginning body weight (BBW): -19lb/-9.08%  Change in weight since last visit: -7.1    Recent Weight History:   Wt Readings from Last 6 Encounters:   06/19/25 86.3 kg (190 lb 3.2 oz)   05/15/25 89.4 kg (197 lb 3.2 oz)   04/17/25 92.1 kg (203 lb)   03/20/25 94.3 kg (207 lb 12.8 oz)   03/06/25 94.9 kg (209 lb 3.2 oz)   08/20/24 105 kg (231 lb)       Body mass index is 29.35 kg/m².   Body composition analysis completed and showed:   Body Fat %: 44.6%    Measurements (in inches)  Waist Circumference: 44      Vital Signs:   /64   Pulse 94   Resp 18   Ht 171.5 cm (67.5\")   Wt 86.3 kg (190 lb 3.2 oz)   SpO2 98%   BMI 29.35 kg/m²     Physical Exam   General appears stated age and normal appearance   HEENT    Chest/lungs Normal rate and Breathing is unlabored   Extremities    Neuro Good historian and No focal deficit   Skin    Psych normal behavior, normal thought content, and normal concentration       Common labs          3/20/2025    09:43   Common Labs   Glucose 85    BUN 13    Creatinine 0.68    Sodium 136    Potassium 4.4    Chloride 102    Calcium 9.4    Albumin 4.6    Total Bilirubin 0.3    Alkaline Phosphatase 49    AST (SGOT) 21    ALT (SGPT) 22    WBC 5.21    Hemoglobin 13.7    Hematocrit 41.9    Platelets 339    Total " Cholesterol 207    Triglycerides 152    HDL Cholesterol 50    LDL Cholesterol  130    Hemoglobin A1C 5.30      TSH          3/20/2025    09:43   TSH   TSH 2.260               Assessment / Plan        Diagnoses and all orders for this visit:    1. Obesity, Class II, BMI 35-39.9 (Primary)  Overview:  Start weight: (209lb.bmi 32   ): 1st goal (10% weight loss):  189 lb  Target Goal: 140-165 lb  Pregnancy prevention: Loestren OCP    Current medication prescribed by MWM: zepbound 3/2025  Current meds: adderall;   Rx Options: Wegovy/Zepbound/Sax; all  Rx Caution:     Exercise: walking pad treadmill/Monday yoga for 1.5 hrs  Food:fast food;     Pancreatitis: No  Glaucoma: No  Headaches: No  HTN: Yes on lisinopril, controlled dr mcfarland  Heart palpitations: No  Thyroid C cell cancer: No  MEN syndrome personal or family hx: No  Nephrolithiasis: No  Gallbladder disease: No  Diabetic Retinopathy: No  SBO/Gastroparesis: No  Etoh use: Yes, weekly 6- glasses of wine (weekends primarily)  Seizures: No    Comorbid conditions: HTN, ADD, Anxiety,   Food:   Exercise:     Calories 5463-0818  Protein 105-132 g/day  Water 100 oz      Assessment & Plan:  Patient's (Body mass index is 29.35 kg/m².) indicates that they are overweight with history of obesity with health conditions that include hypertension . Weight is improving with treatment. BMI  is above average; BMI management plan is completed. We discussed portion control, increasing exercise, pharmacologic options including zepbound, and an dragan-based approach such as Readz Pal or Lose It.     I have instructed the patient to continue with pursuit of medical weight loss as a part of this program. Patient does meet criteria for use of anorectics at this time as BMI > 27 with coexisting, hypertension, and is not at treatment goal.     The current plan for this month includes:   - 1 month f/u visit, she is down 7 lbs on zepbound  - Weight loss goal 4-6lbs this month  - Continue to  work on lifestyle behavioral changes  - Continue nutrition focus  - Continue to prioritize protein, fiber, and hydration.   - continue zepbound 10 mg weekly for weight loss along with reduced calorie diet and exercise  - target weight loss goal 140-165 lb, you are within 1 lb of reaching your 10% weight loss goal at this time    Orders:  -     Tirzepatide-Weight Management (Zepbound) 10 MG/0.5ML solution auto-injector; Inject 0.5 mL under the skin into the appropriate area as directed 1 (One) Time Per Week.  Dispense: 6 mL; Refill: 0    2. Essential hypertension  Assessment & Plan:  Hypertension is stable and controlled  Continue current treatment regimen.  Weight loss.  Regular aerobic exercise.  Blood pressure will be reassessed at next visit.      3. Mixed hyperlipidemia  Overview:  Ldl 130, Trig 152 (3/2025); Ldl 119, Trig 195 (9/2024)    Assessment & Plan:   Stable with mixed elevation, no medication. Continue lifestyle changes      4. Vitamin D insufficiency  Overview:  Vit D 29 (3/2025); start supp 4/2025; lab due 8/2025    Assessment & Plan:  Continue supplement; Lab due 8/2025            Follow Up   Return in about 1 month (around 7/19/2025).  Patient was given instructions and counseling regarding her condition or for health maintenance advice. Please see specific information pulled into the AVS if appropriate.     Samia Jin, APRN  06/19/2025

## 2025-06-19 ENCOUNTER — OFFICE VISIT (OUTPATIENT)
Dept: BARIATRICS/WEIGHT MGMT | Facility: CLINIC | Age: 35
End: 2025-06-19
Payer: COMMERCIAL

## 2025-06-19 VITALS
HEIGHT: 68 IN | HEART RATE: 94 BPM | RESPIRATION RATE: 18 BRPM | DIASTOLIC BLOOD PRESSURE: 64 MMHG | SYSTOLIC BLOOD PRESSURE: 116 MMHG | WEIGHT: 190.2 LBS | BODY MASS INDEX: 28.82 KG/M2 | OXYGEN SATURATION: 98 %

## 2025-06-19 DIAGNOSIS — I10 ESSENTIAL HYPERTENSION: ICD-10-CM

## 2025-06-19 DIAGNOSIS — E66.812 OBESITY, CLASS II, BMI 35-39.9: Primary | ICD-10-CM

## 2025-06-19 DIAGNOSIS — E78.2 MIXED HYPERLIPIDEMIA: ICD-10-CM

## 2025-06-19 DIAGNOSIS — E55.9 VITAMIN D INSUFFICIENCY: ICD-10-CM

## 2025-06-19 RX ORDER — TIRZEPATIDE 10 MG/.5ML
10 INJECTION, SOLUTION SUBCUTANEOUS WEEKLY
Qty: 6 ML | Refills: 0 | Status: SHIPPED | OUTPATIENT
Start: 2025-06-19

## 2025-06-19 RX ADMIN — CYANOCOBALAMIN 1000 MCG: 1000 INJECTION, SOLUTION INTRAMUSCULAR; SUBCUTANEOUS at 15:31

## 2025-06-19 NOTE — ASSESSMENT & PLAN NOTE
Hypertension is stable and controlled  Continue current treatment regimen.  Weight loss.  Regular aerobic exercise.  Blood pressure will be reassessed at next visit.

## 2025-06-19 NOTE — ASSESSMENT & PLAN NOTE
Patient's (Body mass index is 29.35 kg/m².) indicates that they are overweight with history of obesity with health conditions that include hypertension . Weight is improving with treatment. BMI  is above average; BMI management plan is completed. We discussed portion control, increasing exercise, pharmacologic options including zepbound, and an dragan-based approach such as Nuzzel Pal or Lose It.     I have instructed the patient to continue with pursuit of medical weight loss as a part of this program. Patient does meet criteria for use of anorectics at this time as BMI > 27 with coexisting, hypertension, and is not at treatment goal.     The current plan for this month includes:   - 1 month f/u visit, she is down 7 lbs on zepbound  - Weight loss goal 4-6lbs this month  - Continue to work on lifestyle behavioral changes  - Continue nutrition focus  - Continue to prioritize protein, fiber, and hydration.   - continue zepbound 10 mg weekly for weight loss along with reduced calorie diet and exercise  - target weight loss goal 140-165 lb, you are within 1 lb of reaching your 10% weight loss goal at this time

## 2025-07-15 NOTE — PROGRESS NOTES
[facility]  [address]  [place] [address]     Office Note      Date: 2025  Patient Name: Keyonna Truong  MRN: [id number]  : 1990    Subjective     Chief Complaint  Obesity Management follow-up          Keyonna Truong presents to [facility] for obesity management.   Patient is satisfied with weight loss progress. Appetite is well controlled. On zepbound. Reports no side effects of prescribed medications today. The patient is not taking multivitamin and is not taking fish oil.  The patient is using a food journal.  She is feeling that her eating has improved. Her back pain is also improving.       The patient is exercising, will be starting physical therapy soon, walking and swimming, with a FITT score of:    Frequency Intensity Time Strength Training   []   0, none []   0 []   0 [x]   0   [x]   1 (1-2x/week) [x]   1 (light) []   1 (<10 min) []   1 (1x/week)   []   2 (3-5x/week) []   2 (moderate) []   2 (10-20 min) []   2 (2x/week)   []   3 (daily) []   3 (moderately hard)  []   4 (very hard) []   3 (20-30 min)  [x]   4 (>30 min) []   3 (3-4x/week)       Review of Systems   Constitutional:  Negative for appetite change and fatigue.   Eyes:  Negative for visual disturbance.   Cardiovascular:  Negative for chest pain and palpitations.   Gastrointestinal:  Negative for constipation and indigestion.   Neurological:  Negative for light-headedness.   All other systems reviewed and are negative.        Current Outpatient Medications:     Adderall 15 MG tablet, Take 1 tablet by mouth 2 (Two) Times a Day., Disp: , Rfl:     docusate sodium (COLACE) 250 MG capsule, Take 1 capsule by mouth Daily., Disp: 30 capsule, Rfl: 11    Lexapro 10 MG tablet, Take 1 tablet by mouth Daily., Disp: , Rfl:     lisinopril (PRINIVIL,ZESTRIL) 20 MG tablet, Take 1 tablet by mouth Daily., Disp: , Rfl:     Lo Loestrin Fe 1 MG-10 MCG / 10 MCG tablet,  "Take 1 tablet by mouth Daily., Disp: 84 tablet, Rfl: 4    vitamin D (ERGOCALCIFEROL) 1.25 MG (46561 UT) capsule capsule, Take 1 capsule by mouth 1 (One) Time Per Week., Disp: 12 capsule, Rfl: 0    ondansetron ODT (ZOFRAN-ODT) 8 MG disintegrating tablet, Place 1 tablet on the tongue Every 8 (Eight) Hours As Needed for Nausea or Vomiting., Disp: 30 tablet, Rfl: 1    Semaglutide-Weight Management (Wegovy) 1 MG/0.5ML solution auto-injector, Inject 0.5 mL under the skin into the appropriate area as directed 1 (One) Time Per Week., Disp: 2 mL, Rfl: 0    Current Facility-Administered Medications:     cyanocobalamin injection 1,000 mcg, 1,000 mcg, Intramuscular, Q28 Days, Samia Jin, [place], 1,000 mcg at 07/22/25 1624    Objective   Start weight: 209 pounds.    Total Loss lb/%Loss of beginning body weight (BBW): -23.6lb/-11.28%  Change in weight since last visit: -4.6    Recent Weight History:   Wt Readings from Last 6 Encounters:   07/22/25 84.2 kg (185 lb 9.6 oz)   06/19/25 86.3 kg (190 lb 3.2 oz)   05/15/25 89.4 kg (197 lb 3.2 oz)   04/17/25 92.1 kg (203 lb)   03/20/25 94.3 kg (207 lb 12.8 oz)   03/06/25 94.9 kg (209 lb 3.2 oz)       Body mass index is 28.64 kg/m².   Body composition analysis completed and showed:   Body Fat %: 42.5    Measurements (in inches)  Waist Circumference: 42.75      Vital Signs:   /74 (BP Location: Left arm, Patient Position: Sitting)   Pulse 87   Ht 171.5 cm (67.5\")   Wt 84.2 kg (185 lb 9.6 oz)   BMI 28.64 kg/m²     Physical Exam   General appears stated age and normal appearance   HEENT    Chest/lungs Normal rate and Breathing is unlabored   Extremities    Neuro Good historian and No focal deficit   Skin    Psych normal behavior, normal thought content, and normal concentration       Common labs          3/20/2025    09:43   Common Labs   Glucose 85    BUN 13    Creatinine 0.68    Sodium 136    Potassium 4.4    Chloride 102    Calcium 9.4    Albumin 4.6    Total Bilirubin " 0.3    Alkaline Phosphatase 49    AST (SGOT) 21    ALT (SGPT) 22    WBC 5.21    Hemoglobin 13.7    Hematocrit 41.9    Platelets 339    Total Cholesterol 207    Triglycerides 152    HDL Cholesterol 50    LDL Cholesterol  130    Hemoglobin A1C 5.30      TSH          3/20/2025    09:43   TSH   TSH 2.260               Assessment / Plan        Diagnoses and all orders for this visit:    1. Overweight (BMI 25.0-29.9) (Primary)  Overview:  Start weight: (209lb.bmi 32   ): 1st goal (10% weight loss):  189 lb (MET 7/2025)  Target Goal: 140-165 lb  Pregnancy prevention: Loestren OCP    History of obesity BMI > 30    Current medication prescribed by MWM: wegovy started 7/2025 (formulary change insurance); (zepbound 3/2025- Stopped 7/2025)  Current meds: adderall;   Rx Options: Wegovy/Zepbound/Sax; all  Rx Caution:     Exercise: walking pad treadmill/Monday yoga for 1.5 hrs  Food:fast food;     Pancreatitis: No  Glaucoma: No  Headaches: No  HTN: Yes on lisinopril, controlled dr mcfarland  Heart palpitations: No  Thyroid C cell cancer: No  MEN syndrome personal or family hx: No  Nephrolithiasis: No  Gallbladder disease: No  Diabetic Retinopathy: No  SBO/Gastroparesis: No  Etoh use: Yes, weekly 6- glasses of wine (weekends primarily)  Seizures: No    Comorbid conditions: HTN, ADD, Anxiety,   Food:   Exercise:     Calories 5807-2048  Protein 105-132 g/day  Water 100 oz      Assessment & Plan:  Patient's (Body mass index is 28.64 kg/m².) indicates that they are overweight with history of obesity with health conditions that include hypertension . Weight is improving with treatment. BMI  is above average; BMI management plan is completed. We discussed portion control, increasing exercise, pharmacologic options including zepbound, and an dragan-based approach such as Sencera Pal or Lose It.     I have instructed the patient to continue with pursuit of medical weight loss as a part of this program. Patient does meet criteria for use of  anorectics at this time as BMI > 27 with coexisting, hypertension, and is not at treatment goal.     The current plan for this month includes:   - 1 month f/u visit, she is down 5 lbs on zepbound  - Adjust exercise as discussed, as tolerated given injury  - Weight loss goal 4-6lbs this month  - Continue to work on lifestyle behavioral changes  - Continue nutrition focus  - Continue to prioritize protein, fiber, and hydration.   - stop zepbound due to insurance formulary change and replace with wegovy 1 mg weekly for weight loss,   - zofran sent as needed for nausea/vomiting, follow label instructions  - target weight loss goal 140-165 lb, congratulations on reaching your 10 % weight loss goal today    Orders:  -     Semaglutide-Weight Management (Wegovy) 1 MG/0.5ML solution auto-injector; Inject 0.5 mL under the skin into the appropriate area as directed 1 (One) Time Per Week.  Dispense: 2 mL; Refill: 0  -     ondansetron ODT (ZOFRAN-ODT) 8 MG disintegrating tablet; Place 1 tablet on the tongue Every 8 (Eight) Hours As Needed for Nausea or Vomiting.  Dispense: 30 tablet; Refill: 1    2. Mixed hyperlipidemia  Overview:  Ldl 130, Trig 152 (3/2025); Ldl 119, Trig 195 (9/2024)    Assessment & Plan:   Stable with elevation. Lab due 9/2025      3. Essential hypertension  Assessment & Plan:  Hypertension is stable and controlled  Continue current treatment regimen.  Weight loss.  Regular aerobic exercise.  Blood pressure will be reassessed Next visit.      4. Vitamin D insufficiency  Overview:  Vit D 29 (3/2025); start supp 4/2025; lab due 8/2025    Assessment & Plan:  Lab due next visit      5. Nausea  -     Semaglutide-Weight Management (Wegovy) 1 MG/0.5ML solution auto-injector; Inject 0.5 mL under the skin into the appropriate area as directed 1 (One) Time Per Week.  Dispense: 2 mL; Refill: 0          Follow Up   Return in about 1 month (around 8/22/2025).  Patient was given instructions and counseling regarding her  condition or for health maintenance advice. Please see specific information pulled into the AVS if appropriate.     Samia Jin, ROMAIN  07/22/2025

## 2025-07-22 ENCOUNTER — OFFICE VISIT (OUTPATIENT)
Dept: BARIATRICS/WEIGHT MGMT | Facility: CLINIC | Age: 35
End: 2025-07-22
Payer: COMMERCIAL

## 2025-07-22 VITALS
WEIGHT: 185.6 LBS | DIASTOLIC BLOOD PRESSURE: 74 MMHG | HEART RATE: 87 BPM | BODY MASS INDEX: 28.13 KG/M2 | SYSTOLIC BLOOD PRESSURE: 122 MMHG | HEIGHT: 68 IN

## 2025-07-22 DIAGNOSIS — E66.3 OVERWEIGHT (BMI 25.0-29.9): Primary | ICD-10-CM

## 2025-07-22 DIAGNOSIS — E55.9 VITAMIN D INSUFFICIENCY: ICD-10-CM

## 2025-07-22 DIAGNOSIS — I10 ESSENTIAL HYPERTENSION: ICD-10-CM

## 2025-07-22 DIAGNOSIS — E78.2 MIXED HYPERLIPIDEMIA: ICD-10-CM

## 2025-07-22 DIAGNOSIS — R11.0 NAUSEA: ICD-10-CM

## 2025-07-22 PROCEDURE — 99214 OFFICE O/P EST MOD 30 MIN: CPT

## 2025-07-22 PROCEDURE — 96372 THER/PROPH/DIAG INJ SC/IM: CPT

## 2025-07-22 RX ORDER — ONDANSETRON 8 MG/1
8 TABLET, ORALLY DISINTEGRATING ORAL EVERY 8 HOURS PRN
Qty: 30 TABLET | Refills: 1 | Status: SHIPPED | OUTPATIENT
Start: 2025-07-22

## 2025-07-22 RX ORDER — SEMAGLUTIDE 1 MG/.5ML
1 INJECTION, SOLUTION SUBCUTANEOUS WEEKLY
Qty: 2 ML | Refills: 0 | Status: SHIPPED | OUTPATIENT
Start: 2025-07-22

## 2025-07-22 RX ADMIN — CYANOCOBALAMIN 1000 MCG: 1000 INJECTION, SOLUTION INTRAMUSCULAR; SUBCUTANEOUS at 16:24

## 2025-07-22 NOTE — ASSESSMENT & PLAN NOTE
Hypertension is stable and controlled  Continue current treatment regimen.  Weight loss.  Regular aerobic exercise.  Blood pressure will be reassessed Next visit.

## 2025-07-22 NOTE — ASSESSMENT & PLAN NOTE
Patient's (Body mass index is 28.64 kg/m².) indicates that they are overweight with history of obesity with health conditions that include hypertension . Weight is improving with treatment. BMI  is above average; BMI management plan is completed. We discussed portion control, increasing exercise, pharmacologic options including zepbound, and an dragan-based approach such as Mediasurface Pal or Lose It.     I have instructed the patient to continue with pursuit of medical weight loss as a part of this program. Patient does meet criteria for use of anorectics at this time as BMI > 27 with coexisting, hypertension, and is not at treatment goal.     The current plan for this month includes:   - 1 month f/u visit, she is down 5 lbs on zepbound  - Adjust exercise as discussed, as tolerated given injury  - Weight loss goal 4-6lbs this month  - Continue to work on lifestyle behavioral changes  - Continue nutrition focus  - Continue to prioritize protein, fiber, and hydration.   - stop zepbound due to insurance formulary change and replace with wegovy 1 mg weekly for weight loss,   - zofran sent as needed for nausea/vomiting, follow label instructions  - target weight loss goal 140-165 lb, congratulations on reaching your 10 % weight loss goal today

## 2025-07-22 NOTE — PATIENT INSTRUCTIONS
Semaglutide Injection (Weight Management)  What is this medication?  SEMAGLUTIDE (CRYSTAL a GLOO tide) promotes weight loss. It may also be used to maintain weight loss. It works by decreasing appetite. Changes to diet and exercise are often combined with this medication.  This medicine may be used for other purposes; ask your health care provider or pharmacist if you have questions.  COMMON BRAND NAME(S): Wegovy  What should I tell my care team before I take this medication?  They need to know if you have any of these conditions:  Endocrine tumors (MEN 2) or if someone in your family had these tumors  Eye disease, vision problems  Gallbladder disease  History of depression or mental health disease  History of pancreatitis  Kidney disease  Stomach or intestine problems  Suicidal thoughts, plans, or attempt; a previous suicide attempt by you or a family member  Thyroid cancer or if someone in your family had thyroid cancer  An unusual or allergic reaction to semaglutide, other medications, foods, dyes, or preservatives  Pregnant or trying to get pregnant  Breast-feeding  How should I use this medication?  This medication is injected under the skin. You will be taught how to prepare and give it. Take it as directed on the prescription label. It is given once every week (every 7 days). Keep taking it unless your care team tells you to stop.  It is important that you put your used needles and pens in a special sharps container. Do not put them in a trash can. If you do not have a sharps container, call your pharmacist or care team to get one.  A special MedGuide will be given to you by the pharmacist with each prescription and refill. Be sure to read this information carefully each time.  This medication comes with INSTRUCTIONS FOR USE. Ask your pharmacist for directions on how to use this medication. Read the information carefully. Talk to your pharmacist or care team if you have questions.  Talk to your care team about  the use of this medication in children. While it may be prescribed for children as young as 12 years for selected conditions, precautions do apply.  Overdosage: If you think you have taken too much of this medicine contact a poison control center or emergency room at once.  NOTE: This medicine is only for you. Do not share this medicine with others.  What if I miss a dose?  If you miss a dose and the next scheduled dose is more than 2 days away, take the missed dose as soon as possible. If you miss a dose and the next scheduled dose is less than 2 days away, do not take the missed dose. Take the next dose at your regular time. Do not take double or extra doses. If you miss your dose for 2 weeks or more, take the next dose at your regular time or call your care team to talk about how to restart this medication.  What may interact with this medication?  Insulin and other medications for diabetes  This list may not describe all possible interactions. Give your health care provider a list of all the medicines, herbs, non-prescription drugs, or dietary supplements you use. Also tell them if you smoke, drink alcohol, or use illegal drugs. Some items may interact with your medicine.  What should I watch for while using this medication?  Visit your care team for regular checks on your progress. It may be some time before you see the benefit from this medication.  Drink plenty of fluids while taking this medication. Check with your care team if you have severe diarrhea, nausea, and vomiting, or if you sweat a lot. The loss of too much body fluid may make it dangerous for you to take this medication.  This medication may affect blood sugar levels. Ask your care team if changes in diet or medications are needed if you have diabetes.  If you or your family notice any changes in your behavior, such as new or worsening depression, thoughts of harming yourself, anxiety, other unusual or disturbing thoughts, or memory loss, call  your care team right away.  Women should inform their care team if they wish to become pregnant or think they might be pregnant. Losing weight while pregnant is not advised and may cause harm to the unborn child. Talk to your care team for more information.  What side effects may I notice from receiving this medication?  Side effects that you should report to your care team as soon as possible:  Allergic reactions--skin rash, itching, hives, swelling of the face, lips, tongue, or throat  Change in vision  Dehydration--increased thirst, dry mouth, feeling faint or lightheaded, headache, dark yellow or brown urine  Gallbladder problems--severe stomach pain, nausea, vomiting, fever  Heart palpitations--rapid, pounding, or irregular heartbeat  Kidney injury--decrease in the amount of urine, swelling of the ankles, hands, or feet  Pancreatitis--severe stomach pain that spreads to your back or gets worse after eating or when touched, fever, nausea, vomiting  Thoughts of suicide or self-harm, worsening mood, feelings of depression  Thyroid cancer--new mass or lump in the neck, pain or trouble swallowing, trouble breathing, hoarseness  Side effects that usually do not require medical attention (report to your care team if they continue or are bothersome):  Diarrhea  Loss of appetite  Nausea  Stomach pain  Vomiting  This list may not describe all possible side effects. Call your doctor for medical advice about side effects. You may report side effects to FDA at 3-232-FDA-3228.  Where should I keep my medication?  Keep out of the reach of children and pets.  Refrigeration (preferred): Store in the refrigerator. Do not freeze. Keep this medication in the original container until you are ready to take it. Get rid of any unused medication after the expiration date.  Room temperature: If needed, prior to cap removal, the pen can be stored at room temperature for up to 28 days. Protect from light. If it is stored at room  temperature, get rid of any unused medication after 28 days or after it expires, whichever is first.  It is important to get rid of the medication as soon as you no longer need it or it is . You can do this in two ways:  Take the medication to a medication take-back program. Check with your pharmacy or law enforcement to find a location.  If you cannot return the medication, follow the directions in the MedGuide.  NOTE: This sheet is a summary. It may not cover all possible information. If you have questions about this medicine, talk to your doctor, pharmacist, or health care provider.  ©  [employer] (2022 00:00:00)

## 2025-07-28 ENCOUNTER — PRIOR AUTHORIZATION (OUTPATIENT)
Dept: BARIATRICS/WEIGHT MGMT | Facility: CLINIC | Age: 35
End: 2025-07-28
Payer: COMMERCIAL

## 2025-07-28 NOTE — TELEPHONE ENCOUNTER
Keyonna Yeh ([place]: [id number])  Wegovy 1MG/0.5ML auto-injectors  Form  [facility] Electronic PA Form (2017 [employer])        Your PA has been resolved, no additional PA is required.

## 2025-08-21 ENCOUNTER — OFFICE VISIT (OUTPATIENT)
Dept: BARIATRICS/WEIGHT MGMT | Facility: CLINIC | Age: 35
End: 2025-08-21
Payer: COMMERCIAL

## 2025-08-21 VITALS
BODY MASS INDEX: 28.34 KG/M2 | HEART RATE: 89 BPM | SYSTOLIC BLOOD PRESSURE: 116 MMHG | HEIGHT: 68 IN | WEIGHT: 187 LBS | DIASTOLIC BLOOD PRESSURE: 76 MMHG

## 2025-08-21 DIAGNOSIS — E66.3 OVERWEIGHT (BMI 25.0-29.9): Primary | ICD-10-CM

## 2025-08-21 DIAGNOSIS — E78.2 MIXED HYPERLIPIDEMIA: ICD-10-CM

## 2025-08-21 DIAGNOSIS — E55.9 VITAMIN D INSUFFICIENCY: ICD-10-CM

## 2025-08-21 DIAGNOSIS — I10 ESSENTIAL HYPERTENSION: ICD-10-CM

## 2025-08-21 RX ORDER — SEMAGLUTIDE 1 MG/.5ML
1 INJECTION, SOLUTION SUBCUTANEOUS WEEKLY
Qty: 2 ML | Refills: 0 | Status: SHIPPED | OUTPATIENT
Start: 2025-08-21

## 2025-08-21 RX ADMIN — CYANOCOBALAMIN 1000 MCG: 1000 INJECTION, SOLUTION INTRAMUSCULAR; SUBCUTANEOUS at 14:30
